# Patient Record
Sex: MALE | Race: WHITE
[De-identification: names, ages, dates, MRNs, and addresses within clinical notes are randomized per-mention and may not be internally consistent; named-entity substitution may affect disease eponyms.]

---

## 2018-01-21 ENCOUNTER — HOSPITAL ENCOUNTER (INPATIENT)
Dept: HOSPITAL 74 - JER | Age: 48
LOS: 3 days | Discharge: ANOTHER HOSPITAL | DRG: 720 | End: 2018-01-24
Attending: SPECIALIST | Admitting: SPECIALIST
Payer: COMMERCIAL

## 2018-01-21 VITALS — BODY MASS INDEX: 32.4 KG/M2

## 2018-01-21 DIAGNOSIS — I25.10: ICD-10-CM

## 2018-01-21 DIAGNOSIS — F17.210: ICD-10-CM

## 2018-01-21 DIAGNOSIS — J12.9: ICD-10-CM

## 2018-01-21 DIAGNOSIS — J11.08: ICD-10-CM
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
## 2018-01-21 DIAGNOSIS — E78.5: ICD-10-CM

## 2018-01-21 DIAGNOSIS — M62.82: ICD-10-CM

## 2018-01-21 DIAGNOSIS — R00.0: ICD-10-CM

## 2018-01-21 DIAGNOSIS — J96.01: ICD-10-CM

## 2018-01-21 DIAGNOSIS — I10: ICD-10-CM

## 2018-01-21 DIAGNOSIS — E87.1: ICD-10-CM

## 2018-01-21 DIAGNOSIS — R73.9: ICD-10-CM

## 2018-01-21 DIAGNOSIS — R94.5: ICD-10-CM

## 2018-01-21 DIAGNOSIS — N28.9: ICD-10-CM

## 2018-01-21 DIAGNOSIS — J45.909: ICD-10-CM

## 2018-01-21 DIAGNOSIS — F10.10: ICD-10-CM

## 2018-01-21 DIAGNOSIS — I24.8: ICD-10-CM

## 2018-01-21 DIAGNOSIS — E87.2: ICD-10-CM

## 2018-01-21 DIAGNOSIS — R65.21: ICD-10-CM

## 2018-01-21 DIAGNOSIS — E66.9: ICD-10-CM

## 2018-01-21 DIAGNOSIS — N17.9: ICD-10-CM

## 2018-01-21 DIAGNOSIS — A41.9: Primary | ICD-10-CM

## 2018-01-21 LAB
ALBUMIN SERPL-MCNC: 2 G/DL (ref 3.4–5)
ALBUMIN SERPL-MCNC: 2.4 G/DL (ref 3.4–5)
ALP SERPL-CCNC: 153 U/L (ref 45–117)
ALP SERPL-CCNC: 180 U/L (ref 45–117)
ALT SERPL-CCNC: 249 U/L (ref 12–78)
ALT SERPL-CCNC: 326 U/L (ref 12–78)
AMPHET UR-MCNC: NEGATIVE NG/ML
ANION GAP SERPL CALC-SCNC: 10 MMOL/L (ref 8–16)
ANION GAP SERPL CALC-SCNC: 13 MMOL/L (ref 8–16)
APPEARANCE UR: (no result)
APTT BLD: 31.2 SECONDS (ref 26.9–34.4)
ARTERIAL BLOOD GAS PCO2: 24.3 MMHG (ref 35–45)
ARTERIAL PATENCY WRIST A: POSITIVE
AST SERPL-CCNC: 625 U/L (ref 15–37)
AST SERPL-CCNC: 869 U/L (ref 15–37)
BARBITURATES UR-MCNC: NEGATIVE NG/ML
BASE EXCESS BLDA CALC-SCNC: -2.7 MEQ/L (ref -2–2)
BASOPHILS # BLD: 0.3 % (ref 0–2)
BASOPHILS # BLD: 0.6 % (ref 0–2)
BENZODIAZ UR SCN-MCNC: NEGATIVE NG/ML
BILIRUB SERPL-MCNC: 0.8 MG/DL (ref 0.2–1)
BILIRUB SERPL-MCNC: 1 MG/DL (ref 0.2–1)
BILIRUB UR STRIP.AUTO-MCNC: NEGATIVE MG/DL
BNP SERPL-MCNC: 107.08 PG/ML (ref 5–125)
BUN SERPL-MCNC: 40 MG/DL (ref 7–18)
BUN SERPL-MCNC: 42 MG/DL (ref 7–18)
CALCIUM SERPL-MCNC: 6.9 MG/DL (ref 8.5–10.1)
CALCIUM SERPL-MCNC: 7.2 MG/DL (ref 8.5–10.1)
CHLORIDE SERPL-SCNC: 101 MMOL/L (ref 98–107)
CHLORIDE SERPL-SCNC: 94 MMOL/L (ref 98–107)
CO2 SERPL-SCNC: 20 MMOL/L (ref 21–32)
CO2 SERPL-SCNC: 23 MMOL/L (ref 21–32)
COCAINE UR-MCNC: NEGATIVE NG/ML
COHGB MFR BLD: 1.6 GM% (ref 0.5–2)
COLOR UR: YELLOW
CREAT SERPL-MCNC: 1.9 MG/DL (ref 0.7–1.3)
CREAT SERPL-MCNC: 2.1 MG/DL (ref 0.7–1.3)
DEPRECATED RDW RBC AUTO: 13.2 % (ref 11.9–15.9)
DEPRECATED RDW RBC AUTO: 13.4 % (ref 11.9–15.9)
EOSINOPHIL # BLD: 0 % (ref 0–4.5)
EOSINOPHIL # BLD: 0 % (ref 0–4.5)
GLUCOSE SERPL-MCNC: 107 MG/DL (ref 74–106)
GLUCOSE SERPL-MCNC: 136 MG/DL (ref 74–106)
HCT VFR BLD CALC: 37.1 % (ref 35.4–49)
HCT VFR BLD CALC: 40.9 % (ref 35.4–49)
HGB BLD-MCNC: 12.4 GM/DL (ref 11.7–16.9)
HGB BLD-MCNC: 13.8 GM/DL (ref 11.7–16.9)
HYALINE CASTS URNS QL MICRO: 1 /LPF
INR BLD: 1.16 (ref 0.82–1.09)
KETONES UR QL STRIP: NEGATIVE
LEUKOCYTE ESTERASE UR QL STRIP.AUTO: NEGATIVE
LYMPHOCYTES # BLD: 10.7 % (ref 8–40)
LYMPHOCYTES # BLD: 7.3 % (ref 8–40)
MCH RBC QN AUTO: 30.7 PG (ref 25.7–33.7)
MCH RBC QN AUTO: 31.2 PG (ref 25.7–33.7)
MCHC RBC AUTO-ENTMCNC: 33.6 G/DL (ref 32–35.9)
MCHC RBC AUTO-ENTMCNC: 33.6 G/DL (ref 32–35.9)
MCV RBC: 91.3 FL (ref 80–96)
MCV RBC: 92.9 FL (ref 80–96)
METHADONE UR-MCNC: NEGATIVE NG/ML
MONOCYTES # BLD AUTO: 7.7 % (ref 3.8–10.2)
MONOCYTES # BLD AUTO: 7.8 % (ref 3.8–10.2)
MUCOUS THREADS URNS QL MICRO: (no result)
NEUTROPHILS # BLD: 81.3 % (ref 42.8–82.8)
NEUTROPHILS # BLD: 84.3 % (ref 42.8–82.8)
NITRITE UR QL STRIP: NEGATIVE
OPIATES UR QL SCN: NEGATIVE NG/ML
PCP UR QL SCN: NEGATIVE NG/ML
PH BLDV: 7.44 [PH] (ref 7.32–7.42)
PH UR: 5 [PH] (ref 5–8)
PLATELET # BLD AUTO: 220 K/MM3 (ref 134–434)
PLATELET # BLD AUTO: 258 K/MM3 (ref 134–434)
PMV BLD: 8.2 FL (ref 7.5–11.1)
PMV BLD: 8.4 FL (ref 7.5–11.1)
PO2 BLDA: 54.8 MMHG (ref 80–100)
POTASSIUM SERPLBLD-SCNC: 3.6 MMOL/L (ref 3.5–5.1)
POTASSIUM SERPLBLD-SCNC: 3.7 MMOL/L (ref 3.5–5.1)
PROT SERPL-MCNC: 6 G/DL (ref 6.4–8.2)
PROT SERPL-MCNC: 7.1 G/DL (ref 6.4–8.2)
PROT UR QL STRIP: (no result)
PROT UR QL STRIP: NEGATIVE
PT PNL PPP: 13.1 SEC (ref 9.98–11.88)
RBC # BLD AUTO: 3.99 M/MM3 (ref 4–5.6)
RBC # BLD AUTO: 4.48 M/MM3 (ref 4–5.6)
RBC # UR STRIP: (no result) /UL
SAO2 % BLDA: 88.1 % (ref 90–98.9)
SODIUM SERPL-SCNC: 127 MMOL/L (ref 136–145)
SODIUM SERPL-SCNC: 134 MMOL/L (ref 136–145)
SP GR UR: 1.01 (ref 1–1.03)
UROBILINOGEN UR STRIP-MCNC: NEGATIVE MG/DL (ref 0.2–1)
VENOUS PC02: 30.8 MMHG (ref 38–52)
VENOUS PO2: 35.3 MMHG (ref 28–48)
WBC # BLD AUTO: 11 K/MM3 (ref 4–10)
WBC # BLD AUTO: 11.4 K/MM3 (ref 4–10)

## 2018-01-21 PROCEDURE — 5A09357 ASSISTANCE WITH RESPIRATORY VENTILATION, LESS THAN 24 CONSECUTIVE HOURS, CONTINUOUS POSITIVE AIRWAY PRESSURE: ICD-10-PCS | Performed by: STUDENT IN AN ORGANIZED HEALTH CARE EDUCATION/TRAINING PROGRAM

## 2018-01-21 PROCEDURE — G0480 DRUG TEST DEF 1-7 CLASSES: HCPCS

## 2018-01-21 RX ADMIN — OSELTAMIVIR PHOSPHATE SCH MG: 75 CAPSULE ORAL at 22:08

## 2018-01-21 RX ADMIN — ACETAMINOPHEN PRN MG: 10 INJECTION, SOLUTION INTRAVENOUS at 20:36

## 2018-01-21 RX ADMIN — CHLORHEXIDINE GLUCONATE SCH APPLIC: 213 SOLUTION TOPICAL at 22:09

## 2018-01-21 RX ADMIN — VANCOMYCIN HYDROCHLORIDE SCH MLS/HR: 1 INJECTION, POWDER, LYOPHILIZED, FOR SOLUTION INTRAVENOUS at 21:33

## 2018-01-21 RX ADMIN — MUPIROCIN SCH APPLIC: 20 OINTMENT TOPICAL at 22:08

## 2018-01-21 RX ADMIN — LEVOFLOXACIN SCH: 5 INJECTION, SOLUTION INTRAVENOUS at 19:25

## 2018-01-21 NOTE — PDOC
Attending Attestation





- Resident


Resident Name: Saul Peñaloza





- ED Attending Attestation


I have performed the following: I have examined & evaluated the patient, The 

case was reviewed & discussed with the resident, I agree w/resident's findings 

& plan, Exceptions are as noted





- HPI


HPI: 





01/21/18 08:44


47y M hx of htn, hl asthma presents with complaint of sob. Per girlfriend, the 

pt has been having fever and cough for about a week with alot of congestion. GF 

also with similar symptoms last week. Pt endorses worsening sob, and gf finally 

convinced him to come to the ED today. 





Pt denies any chest pain, leg swelling, hemoptysis, dizziness, abd pain, n/v.





pt arrived with sat of 60% on RA, in mild distress, was started on NRB with 

improvement to 80%, started on bipap with improvement to 94% (on 12/6, 75% O2).

  


Pts pulm exam noted for scant rales at the bsaes and deminshed breathsounds b/l


abd soft notnender





ddx includes pna, influenza, ?consier PE, ?underlying lung/heart dz?


sepsis orderset initiated


cxr


continue bipap


will reassess


01/21/18 10:52





The pts labs were reviweed - cmp noted for:


 Laboratory Tests











  01/21/18 01/21/18 01/21/18





  08:10 08:10 09:30


 


Sodium    127 L


 


Potassium    3.6


 


Chloride    94 L


 


Carbon Dioxide    20 L


 


Anion Gap    13


 


BUN    42 H


 


Creatinine    2.1 H


 


Creat Clearance w eGFR    34.02


 


Random Glucose    136 H


 


Lactic Acid   2.1 H 


 


Calcium    7.2 L


 


Total Bilirubin    1.0


 


AST    869 H


 


ALT    326 H


 


Alkaline Phosphatase    180 H


 


Ammonia  75.5 H  


 


Creatine Kinase    6546 H


 


Creatine Kinase Index    0.0


 


CK-MB (CK-2)    < 1.0


 


Troponin I    0.06 H


 


Total Protein    7.1


 


Albumin    2.4 L








ALIDA, elevated LFTs, trop at .06 - 


?CHF - awaitqing BNP


pt doing well on bipap





will obtain cxr to r/o infiltrate as his cxr shows a very large guillaume - 

congestion vs. pna


pt on abx





01/21/18 12:01


pts bnp wnl


unclear etiology - 


will obtain CTA of chest


pt admitted to ICU for further management








01/21/18 16:13


CRITICAL CARE DOCUMENTATION:





I spent ~120 minutes of Critical Care time, excluding separately billable 

procedures, involving high complexity decision making to assess, manipulate and 

support vital system function(s) to treat single or multiple vital organ system 

failure and/or to prevent further life threatening deterioration of the patient'

s condition.





- Medical Decision Making





01/21/18 16:14


pt stable on bipap but quickly desaturates when bipap taken off





ct shows ?edema 


?ARDS?


low threshold for intubation


cards/pulm consulted








**Heart Score/ECG Review





- ECG Impressions


Comment:: 





01/21/18 12:39


Twelve-lead EKG was performed and reviewed by me. 


There is normal sinus rhythm with a rate of 110


Nonspecific T wave abnormality

## 2018-01-21 NOTE — CON.ID
Consult


Consult Specialty:: infectious disease


Referred by:: anais


Reason for Consultation:: fever, sob, cough





- History of Present Illness


Chief Complaint: cough, fever and chills for one week


History of Present Illness: 


47 year old man presented to Ed with cough, fever, chills for one week


his girlfriend and daughter alll had cough and fever


they went to [facility] on Wednesday


his daughter tested positive for influenza and was given tamiflu


he and his girlfriend tested negative and were given amox- he has taken two 

pills


eating poorly


no diarrhea


urinating okay


no hemoptysis


6 cig daily


no travel


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





no weight loss


etoh use- stopped one week ago when he was sick


was intermittently confused at home, now more oriented





denies hiv/IVDU


consents to HIV testing











- History Source


History Provided By: Patient, Significant Other


Limitations to Obtaining History: Clinical Condition





- Past Medical History


Cardio/Vascular: Yes: HTN, Other (hyperlipidemia)


Pulmonary: Yes: Asthma





- Past Surgical History


Past Surgical History: Yes: None





- Alcohol/Substance Use


Hx Alcohol Use: Yes (beer and wine)





- Smoking History


Smoking history: Current every day smoker


Have you smoked in the past 12 months: Yes


Aproximately how many cigarettes per day: 20





- Social History


Usual Living Arrangement: With Significant Other


ADL: Independent


Occupation: [employer] business


Came to [place]year[place] ad[place]


History of Recent Travel: No





Home Medications





- Allergies


Allergies/Adverse Reactions: 


 Allergies











Allergy/AdvReac Type Severity Reaction Status Date / Time


 


No Known Allergies Allergy   Verified 01/21/18 08:18














- Home Medications


Home Medications: 


Ambulatory Orders





Aspirin 81 mg PO DAILY 01/21/18 


Atorvastatin Ca [Lipitor] 20 mg PO HS 01/21/18 


Benzonatate 200 mg PO DAILY PRN 01/21/18 


Chlorthalidone 25 mg PO DAILY 01/21/18 


Metoprolol Succinate 25 mg PO DAILY 01/21/18 


Paroxetine HCl [Paxil] 20 mg PO DAILY 01/21/18 


Simvastatin 40 mg PO HS 01/21/18 











Family Disease History





- Family Disease History


Family History: Unable to Obtain





Review of Systems





- Review of Systems


Constitutional: reports: Fever, Loss of Appetite, Weakness


Eyes: reports: No Symptoms


HENT: reports: No Symptoms


Neck: reports: No Symptoms


Cardiovascular: denies: Chest Pain


Respiratory: reports: Cough.  denies: Hemoptysis


Gastrointestinal: reports: No Symptoms.  denies: Abdominal Pain, Diarrhea


Genitourinary: denies: Dysuria, Flank Pain


Musculoskeletal: reports: No Symptoms


Integumentary: reports: No Symptoms


Neurological: reports: No Symptoms





Physical Exam


Vital Signs: 


 Vital Signs











Temperature  102.5 F H  01/21/18 09:05


 


Pulse Rate  77   01/21/18 14:51


 


Respiratory Rate  22   01/21/18 14:51


 


Blood Pressure  105/73   01/21/18 14:51


 


O2 Sat by Pulse Oximetry (%)  100   01/21/18 14:51











Psychiatric: Yes: Alert, Oriented


Labs: 


 CBC, BMP





 01/21/18 08:10 





 01/21/18 09:30 











Imaging





- Results


Chest X-ray: Report Reviewed, Image Reviewed


Cat Scan: Report Reviewed, Image Reviewed





Problem List





- Problems


(1) Acute respiratory failure


Code(s): J96.00 - ACUTE RESPIRATORY FAILURE, UNSP W HYPOXIA OR HYPERCAPNIA   





(2) Pneumonia


Code(s): J18.9 - PNEUMONIA, UNSPECIFIED ORGANISM   





(3) Hyponatremia


Code(s): E87.1 - HYPO-OSMOLALITY AND HYPONATREMIA   





(4) Rhabdomyolysis


Code(s): M62.82 - RHABDOMYOLYSIS   





(5) Abnormal LFTs


Code(s): R94.5 - ABNORMAL RESULTS OF LIVER FUNCTION STUDIES   





Assessment/Plan





impending respiratory failure


suspect secondary to Influenza given history from his girlfriend who is a nurse





would treat for influenza and bacterial pneumonia


vancomycin/levaquin/tamiflu


he has hyponatremia and elevated LFTs/rhabdomyolysis 


will cover for atypical pneumonia as well





droplet isolation


he consents to HIV testing





hiv


legionella urinary antigen


repeat cmp/cpk


vanco trough


hep serology








d/w intensivist


d/w ER staff





d/w cardiology


over 45 minutes spent in the care of this critically illl ICU patient

## 2018-01-21 NOTE — HP
Admitting History and Physical





- Admission


Chief Complaint: not feeling well / coughing /


History of Present Illness: 











The patient is a 47M with a PMH of HTN, asthma, and EtOH abuse who presents to 

the ED with complaints of "feeling sick". The patient is accompanied with his 

fiance who is a nurse and providing some of the history. The patient states 

that he's been coughing for 1 week, has had fevers and chills. The fiance 

states that the patient has also been delusional and talking to himself. He 

denies any CP, SOB, nausea, vomiting, myalgias, abdominal pain. The patient 

drinks 3-4 beers a day and a pint of liquor. He has not had a drink in 8 days 

since he's been sick. He was seen at an  on Wednesday (5 days ago) and was 

given amoxicillin and told to get evaluated in the ER because of his breathing, 

but he did not until he presented this morning.





History Source: Patient, Significant Other


Limitations to Obtaining History: No Limitations





- Past Medical History


Cardiovascular: Yes: HTN, Other (hyperlipidemia)


Pulmonary: Yes: Asthma





- Past Surgical History


Past Surgical History: Yes: None





- Smoking History


Smoking history: Current every day smoker


Have you smoked in the past 12 months: Yes


Aproximately how many cigarettes per day: 20





- Alcohol/Substance Use


Hx Alcohol Use: Yes (beer and wine)





- Social History


ADL: Independent


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


History of Recent Travel: No





Home Medications





- Allergies


Allergies/Adverse Reactions: 


 Allergies











Allergy/AdvReac Type Severity Reaction Status Date / Time


 


No Known Allergies Allergy   Verified 18 08:18














- Home Medications


Home Medications: 


Ambulatory Orders





Aspirin 81 mg PO DAILY 18 


Atorvastatin Ca [Lipitor] 20 mg PO HS 18 


Benzonatate 200 mg PO DAILY PRN 18 


Chlorthalidone 25 mg PO DAILY 18 


Metoprolol Succinate 25 mg PO DAILY 18 


Paroxetine HCl [Paxil] 20 mg PO DAILY 18 


Simvastatin 40 mg PO HS 18 











Family Disease History





- Family Disease History


Family Disease History: Heart Disease: Father ( of "heart problems" age 47)





Physical Examination


Vital Signs: 


 Vital Signs











Temperature  102.5 F H  18 09:05


 


Pulse Rate  77   18 14:51


 


Respiratory Rate  22   01/21/18 14:51


 


Blood Pressure  105/73   18 14:51


 


O2 Sat by Pulse Oximetry (%)  94 L  18 18:32











Constitutional: Yes: Well Nourished, Anxious


Eyes: Yes: Conjunctiva Clear, EOM Intact


HENT: Yes: Atraumatic, Normocephalic


Neck: Yes: Supple, Trachea Midline


Cardiovascular: Yes: Regular Rate and Rhythm


Respiratory: Yes: CTA Bilaterally.  No: Other (Wearing Bipap  Fio2 80 with 98%)


Gastrointestinal: Yes: Normal Bowel Sounds, Soft


Renal/: Yes: WNL


Breast(s): Yes: WNL


Musculoskeletal: Yes: WNL


Extremities: Yes: WNL


Edema: No


Peripheral Pulses WNL: Yes


Peripheral Pulses: Left Radial: 2+, Right Radial: 2+, Left Doralis Pedis: 2+, 

Right Dorsalis Pedis: 2+, Left Femoral: 2+, Right Femoral: 2+


Integumentary: Yes: WNL


Neurological: Yes: WNL, Alert, Oriented


...Motor Strength: WNL


Psychiatric: Yes: Alert, Oriented


Labs: 


 CBC, BMP





 18 15:44 





 18 15:44 











Problem List





- Problems


(1) ARDS (adult respiratory distress syndrome)


Assessment/Plan: 


bipap keep O2 sat >90%  FiO2  @80%


pulmonary consult 


     ICU care 


ID consult  


    emperic tx 


Isolation 


Code(s): J80 - ACUTE RESPIRATORY DISTRESS SYNDROME   





(2) Rhabdomyolysis


Assessment/Plan: 


IV fluids 


follow labs 


Code(s): M62.82 - RHABDOMYOLYSIS   





(3) Abnormal LFTs


Assessment/Plan: 


hx of ETOH 


will repeat  in am


if persist GI consult 


    U tox  / ETOH level  negative 


    last drink 8 days ago 


Code(s): R94.5 - ABNORMAL RESULTS OF LIVER FUNCTION STUDIES   





(4) Acute respiratory failure


Assessment/Plan: 


ctof chest   c/w ARDS 


bipap 


Code(s): J96.00 - ACUTE RESPIRATORY FAILURE, UNSP W HYPOXIA OR HYPERCAPNIA   





(5) Hypoxia


Assessment/Plan: 


bipap





Code(s): R09.02 - HYPOXEMIA   





(6) Obesity


Code(s): E66.9 - OBESITY, UNSPECIFIED   





(7) Pneumonia


Code(s): J18.9 - PNEUMONIA, UNSPECIFIED ORGANISM   





(8) Substance abuse


Code(s): F19.10 - OTHER PSYCHOACTIVE SUBSTANCE ABUSE, UNCOMPLICATED

## 2018-01-21 NOTE — CON.CARD
Consult


Consult Specialty:: cardiology


Reason for Consultation:: shortness of breath





- History of Present Illness


Chief Complaint: markedly short of breath


History of Present Illness: 





47y M hx of htn, hyperlipidemia,  asthma, obesity, substance abuse (cocaine 

years ago; tobacoo; alcohol--last drink one week ago; ?anaboic steroids) 

presents with complaint of sob. Per girlfriend, the pt has been having fever 

and cough for about a week with alot of congestion. GF also with similar 

symptoms last week. Pt endorses worsening sob, and gf finally convinced him to 

come to the ED today. One family member is being treated for influenza.





Pt denies any chest pain, leg swelling, hemoptysis, dizziness, abd pain, n/v.





pt arrived with sat of 60% on RA, in mild distress, was started on NRB with 

improvement to 80%, started on bipap with improvement to 94% (on , 75% O2).

  


Pts pulm exam noted for scant rales at the bsaes and deminshed breathsounds b/l


abd soft notnender





ddx includes pna, influenza, ?consier PE, ?underlying lung/heart dz?


sepsis orderset initiated


cxr


continue bipap





- History Source


History Provided By: Patient, Family Member, Medical Record


Limitations to Obtaining History: Other (pt with Bipap mask; finds it difficult 

to breathe; agitated)





- Past Medical History


Cardio/Vascular: Yes: HTN, Other (hyperlipidemia)


Pulmonary: Yes: Asthma


Renal/: Yes: Renal Inusuff





- Past Surgical History


Past Surgical History: Yes: None





- Alcohol/Substance Use


Hx Alcohol Use: Yes (beer and wine)





- Smoking History


Smoking history: Current every day smoker


Have you smoked in the past 12 months: Yes


Aproximately how many cigarettes per day: 20





- Social History


Usual Living Arrangement: With Significant Other


ADL: Independent


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


History of Recent Travel: No





Home Medications





- Allergies


Allergies/Adverse Reactions: 


 Allergies











Allergy/AdvReac Type Severity Reaction Status Date / Time


 


No Known Allergies Allergy   Verified 18 08:18














- Home Medications


Home Medications: 


Ambulatory Orders





Aspirin 81 mg PO DAILY 18 


Atorvastatin Ca [Lipitor] 20 mg PO HS 18 


Benzonatate 200 mg PO DAILY PRN 18 


Chlorthalidone 25 mg PO DAILY 18 


Metoprolol Succinate 25 mg PO DAILY 18 


Paroxetine HCl [Paxil] 20 mg PO DAILY 18 


Simvastatin 40 mg PO HS 18 











Family Disease History





- Family Disease History


Family Disease History: Heart Disease: Father ( of "heart problems" age 47)





Review of Systems





- Review of Systems


Constitutional: reports: Fever, Other


Eyes: reports: No Symptoms


HENT: reports: No Symptoms


Neck: reports: No Symptoms


Cardiovascular: reports: Shortness of Breath


Respiratory: reports: Cough, SOB


Gastrointestinal: reports: No Symptoms


Breasts: reports: No Symptoms Reported


Musculoskeletal: denies: Muscle Pain


Integumentary: reports: No Symptoms


Neurological: reports: No Symptoms


Endocrine: reports: No Symptoms


Hematology/Lymphatic: reports: No Symptoms


Psychiatric: reports: No Symptoms





- Risk Factors


Known Risk Factors: Yes: Age, Family History, Gender, Hypercholesterolemia, 

Other (obesity; substance abuse)


Vital Signs: 


 Vital Signs











Temperature  102.5 F H  18 09:05


 


Pulse Rate  77   18 14:51


 


Respiratory Rate  22   18 14:51


 


Blood Pressure  105/73   18 14:51


 


O2 Sat by Pulse Oximetry (%)  100   18 14:51











Constitutional: Yes: Anxious, Moderate Distress


Eyes: Yes: WNL


HENT: Yes: WNL


Neck: Yes: WNL


Respiratory: Yes: Cough, On BiPap, Poor Air Entry, SOB, Tachypnea


Gastrointestinal: Yes: Soft, Abdomen, Obese


Renal/: No: Anuria


Cardiovascular: Yes: Tachycardia


JVD: No


Carotid Bruit: No


PMI: Displaced


Heart Sounds: Yes: S1, S2, S4


Edema: No


Peripheral Pulses WNL: Yes


Integumentary: Yes: WNL


Neurological: Yes: WNL


Psychiatric: Yes: WNL





- Other Data


Labs, Other Data: 


 CBC, BMP





 18 15:44 





 INR, PTT











INR  1.16  (0.82-1.09)  H  18  08:10    








 Troponin, BNP











  18





  09:30


 


Troponin I  0.06 H


 


B-Natriuretic Peptide  107.08








 Troponin, BNP











  18





  09:30


 


Troponin I  0.06 H


 


B-Natriuretic Peptide  107.08








 Abnormal Lab Results











  18





  08:10 08:10 08:10


 


WBC  11.4 H  


 


RBC   


 


Neutrophils %  84.3 H  


 


Lymphocytes %  7.3 L  


 


PT with INR   13.10 H 


 


INR   1.16 H 


 


ABG pH   


 


ABG pCO2 at Pt Temp   


 


ABG pO2 at Pt Temp   


 


ABG HCO3   


 


ABG O2 Sat (Measured)   


 


ABG Base Excess   


 


VBG pH    7.44 H


 


POC VBG pCO2    30.8 L


 


Sodium   


 


Chloride   


 


Carbon Dioxide   


 


BUN   


 


Creatinine   


 


Random Glucose   


 


Lactic Acid   


 


Calcium   


 


AST   


 


ALT   


 


Alkaline Phosphatase   


 


Ammonia   


 


Creatine Kinase   


 


Troponin I   


 


Albumin   


 


Urine Protein   


 


Urine Blood   














  18





  08:10 08:10 09:00


 


WBC   


 


RBC   


 


Neutrophils %   


 


Lymphocytes %   


 


PT with INR   


 


INR   


 


ABG pH    7.49 H


 


ABG pCO2 at Pt Temp    24.3 L


 


ABG pO2 at Pt Temp    54.8 L


 


ABG HCO3    18.5 L


 


ABG O2 Sat (Measured)    88.1 L


 


ABG Base Excess    -2.7 L


 


VBG pH   


 


POC VBG pCO2   


 


Sodium   


 


Chloride   


 


Carbon Dioxide   


 


BUN   


 


Creatinine   


 


Random Glucose   


 


Lactic Acid   2.1 H 


 


Calcium   


 


AST   


 


ALT   


 


Alkaline Phosphatase   


 


Ammonia  75.5 H  


 


Creatine Kinase   


 


Troponin I   


 


Albumin   


 


Urine Protein   


 


Urine Blood   














  18





  09:30 13:20 15:44


 


WBC    11.0 H


 


RBC    3.99 L


 


Neutrophils %   


 


Lymphocytes %   


 


PT with INR   


 


INR   


 


ABG pH   


 


ABG pCO2 at Pt Temp   


 


ABG pO2 at Pt Temp   


 


ABG HCO3   


 


ABG O2 Sat (Measured)   


 


ABG Base Excess   


 


VBG pH   


 


POC VBG pCO2   


 


Sodium  127 L  


 


Chloride  94 L  


 


Carbon Dioxide  20 L  


 


BUN  42 H  


 


Creatinine  2.1 H  


 


Random Glucose  136 H  


 


Lactic Acid   


 


Calcium  7.2 L  


 


AST  869 H  


 


ALT  326 H  


 


Alkaline Phosphatase  180 H  


 


Ammonia   


 


Creatine Kinase  6546 H  


 


Troponin I  0.06 H  


 


Albumin  2.4 L  


 


Urine Protein   2+ H 


 


Urine Blood   3+ H 














Imaging





- Results


Cat Scan: Image Reviewed


EKG: Image Reviewed (sinus tachycardia)





Problem List





- Problems


(1) Substance abuse


Assessment/Plan: 


as per pt's PMD (Dr. Krause), pt may have, in years past, used cocaine. He 

drinks heavily at times, is a daily cigarettes smoker. He is a personal bret 

at a gym, and may have used steroids.


He has been noncompliant to doctors' visits and medications.


Code(s): F19.10 - OTHER PSYCHOACTIVE SUBSTANCE ABUSE, UNCOMPLICATED   





(2) ARDS (adult respiratory distress syndrome)


Assessment/Plan: 


On Bipap, with improvement in O2 sat from 60& to 94%.


On Tamiflu for presumed influenza.


ECHO for LVEF, chamber sizes, valve status.


Code(s): J80 - ACUTE RESPIRATORY DISTRESS SYNDROME   





(3) Hyponatremia


Code(s): E87.1 - HYPO-OSMOLALITY AND HYPONATREMIA   





(4) Rhabdomyolysis


Assessment/Plan: 


r/o rhabdomyolysis


Pt received fluids, but problematic given state of lungs.


F/u CK, BUN/Cr, Is and Os, weight serially.


Code(s): M62.82 - RHABDOMYOLYSIS   





(5) Obesity


Code(s): E66.9 - OBESITY, UNSPECIFIED   





(6) Congestive cardiac failure


Assessment/Plan: 


Enlarged heart with ?LV dilation on CT chest.


; 


For ECHO (LVEF; chamber sizes; wall motion; valve status.


Extensive coronary artery calcifications on CT chest, with multiple cardiac 

risks, including his father dying at 47 yrs old of "heart disease"; will 

require coronary artery evaluation with stress test and/or angiogram when 

stable.


TNI 0.06; f/u serially.


F/u TSH; lipids.





Code(s): I50.9 - HEART FAILURE, UNSPECIFIED   


Qualifiers: 


   Congestive heart failure type: unspecified   Congestive heart failure 

chronicity: unspecified   Qualified Code(s): I50.9 - Heart failure, unspecified

   





(7) Cigarette nicotine dependence


Assessment/Plan: 


Pt has not smoked for a week; consider aid with patch or pill.


Code(s): F17.210 - NICOTINE DEPENDENCE, CIGARETTES, UNCOMPLICATED   





(8) Helm cardiac risk >20% in next 10 years


Assessment/Plan: 


Coronary artery evaluation with stress MIBI and/or coronary angiogram when 

stable.


Aggressive lipid control; diet change to heart-healthy one, with weight loss; 

BP control; treatment for substance abuse (including alcohol).


Code(s): Z91.89 - OTH PERSONAL RISK FACTORS, NOT ELSEWHERE CLASSIFIED   





(9) Hyperglycemia


Assessment/Plan: 


f/u fasting glucose, HGBA1c.


Code(s): R73.9 - HYPERGLYCEMIA, UNSPECIFIED   





(10) Renal insufficiency


Assessment/Plan: 


on fluids (problematic with pulmonary status).





Code(s): N28.9 - DISORDER OF KIDNEY AND URETER, UNSPECIFIED

## 2018-01-21 NOTE — CONSULT
Consult


Consult Specialty:: PULM/CCM 


Referred by:: NAWAF


Reason for Consultation:: SOB





- History of Present Illness


Chief Complaint: SOB


History of Present Illness: 


47 M, previous ETOH abuse (stopped 1 week ago), active smoker, no IVDU, no 

clear occupational exposure history.  (+) Flu contact -> daughter.  


Reports never screened for HIV. 


No apparent travel history.  No hemoptysis. 





Called to the ER due to severe hypoxic acute respiratory failure.  Awake and 

alert on NIPPV.  80% FiO2 with a saturation of 95%.  





CT: ARDS pattern with areas of consolidation. 


 





- History Source


History Provided By: Patient


Limitations to Obtaining History: Poor Historian





- Past Medical History


Pulmonary: Yes: Asthma, Sleep Apnea.  No: COPD, Pneumonia, Previously Intubated

, Pulmonary Embolus





- Smoking History


Smoking history: Current every day smoker


Aproximately how many cigarettes per day: 20





Home Medications





- Allergies


Allergies/Adverse Reactions: 


 Allergies











Allergy/AdvReac Type Severity Reaction Status Date / Time


 


No Known Allergies Allergy   Verified 01/21/18 08:18














- Home Medications


Home Medications: 


Ambulatory Orders





Aspirin 81 mg PO DAILY 01/21/18 


Atorvastatin Ca [Lipitor] 20 mg PO HS 01/21/18 


Benzonatate 200 mg PO DAILY PRN 01/21/18 


Chlorthalidone 25 mg PO DAILY 01/21/18 


Metoprolol Succinate 25 mg PO DAILY 01/21/18 


Paroxetine HCl [Paxil] 20 mg PO DAILY 01/21/18 


Simvastatin 40 mg PO HS 01/21/18 











Review of Systems





- Review of Systems


Constitutional: reports: Chills, Fever, Malaise, Weakness.  denies: Night Sweats

, Unintentional Wgt. Loss


Eyes: reports: No Symptoms


HENT: reports: No Symptoms


Neck: reports: No Symptoms


Cardiovascular: reports: Shortness of Breath.  denies: Chest Pain, Edema, 

Palpitations


Respiratory: reports: Cough, Snoring, SOB, SOB on Exertion, Wheezing.  denies: 

Hemoptysis


Gastrointestinal: reports: No Symptoms


Genitourinary: reports: No Symptoms


Breasts: reports: No Symptoms Reported


Musculoskeletal: reports: No Symptoms


Integumentary: reports: No Symptoms


Neurological: reports: No Symptoms


Endocrine: reports: No Symptoms


Hematology/Lymphatic: reports: No Symptoms


Psychiatric: reports: No Symptoms





Physical Exam


Vital Signs: 


 Vital Signs











Temperature  102.5 F H  01/21/18 09:05


 


Pulse Rate  77   01/21/18 14:51


 


Respiratory Rate  22   01/21/18 14:51


 


Blood Pressure  105/73   01/21/18 14:51


 


O2 Sat by Pulse Oximetry (%)  100   01/21/18 14:51











Constitutional: Yes: Moderate Distress


Eyes: Yes: Conjunctiva Clear, EOM Intact


HENT: Yes: Atraumatic, Normocephalic


Neck: Yes: Supple, Trachea Midline


Cardiovascular: Yes: Tachycardia


Respiratory: Yes: Accessory Muscle Use, Cough, On BiPap, Rhonchi, SOB, 

Tachypnea.  No: Stridor, Wheezes


Gastrointestinal: Yes: Normal Bowel Sounds, Soft, Abdomen, Obese


Renal/: Yes: WNL


Musculoskeletal: Yes: WNL


Extremities: Yes: WNL


Edema: No


Peripheral Pulses WNL: Yes


Integumentary: Yes: WNL


Neurological: Yes: Alert, Oriented


...Motor Strength: WNL


Psychiatric: Yes: WNL, Alert, Oriented


Labs: 


 CBC, BMP





 01/21/18 08:10 





 01/21/18 09:30 











Imaging





- Results


Chest X-ray: Report Reviewed, Image Reviewed


Cat Scan: Report Reviewed, Image Reviewed





Problem List





- Problems


(1) ARDS (adult respiratory distress syndrome)


Code(s): J80 - ACUTE RESPIRATORY DISTRESS SYNDROME   





(2) Acute respiratory failure


Code(s): J96.00 - ACUTE RESPIRATORY FAILURE, UNSP W HYPOXIA OR HYPERCAPNIA   





(3) Rhabdomyolysis


Code(s): M62.82 - RHABDOMYOLYSIS   





(4) Hyponatremia


Code(s): E87.1 - HYPO-OSMOLALITY AND HYPONATREMIA   





(5) Hypoxia


Code(s): R09.02 - HYPOXEMIA   





Assessment/Plan


NIPPV settings were adjusted and patient is now more comfortable with a 

saturation of 95%


ABX / Tamiflu per ID 


Aspiration precautions


Judicious IVF so as not to worsen ARDS physiology 


BD TX 


D/W wife and patient low threshold for intubation and mechanical ventilation


Strict I&O 


VTE prophylaxis 


ICU monitoring





Dr Otto 


Critical care time spent in reviewing chart, evaluating patient and formulating 

plan - 36 minutes.

## 2018-01-21 NOTE — EKG
Test Reason : 

Blood Pressure : ***/*** mmHG

Vent. Rate : 097 BPM     Atrial Rate : 097 BPM

   P-R Int : 166 ms          QRS Dur : 080 ms

    QT Int : 326 ms       P-R-T Axes : 025 -01 012 degrees

   QTc Int : 414 ms

 

NORMAL SINUS RHYTHM

MINIMAL VOLTAGE CRITERIA FOR LVH, MAY BE NORMAL VARIANT

NONSPECIFIC T WAVE ABNORMALITY

ABNORMAL ECG

NO PREVIOUS ECGS AVAILABLE

Confirmed by Duran Batres ([id number]) on 1/21/2018 4:04:42 PM

 

Referred By:             Confirmed By:Duran Batres

## 2018-01-21 NOTE — PDOC
History of Present Illness





- General


Chief Complaint: Shortness of Breath


Stated Complaint: FEVER,DIFFICULTY BREATHING,HIGH BLOOD PRESSURE


Time Seen by Provider: 01/21/18 08:24


History Source: Patient


Exam Limitations: No Limitations





- History of Present Illness


Initial Comments: 





01/21/18 10:08


The patient is a 47M with a PMH of HTN, asthma, and EtOH abuse who presents to 

the ED with complaints of "feeling sick". The patient is accompanied with his 

fiance who is a nurse and providing some of the history. The patient states 

that he's been coughing for 1 week, has had fevers and chills. The fiance 

states that the patient has also been delusional and talking to himself. He 

denies any CP, SOB, nausea, vomiting, myalgias, abdominal pain. The patient 

drinks 3-4 beers a day and a pint of liquor. He has not had a drink in 8 days 

since he's been sick. He was seen at an  on Wednesday (5 days ago) and was 

given amoxicillin and told to get evaluated in the ER because of his breathing, 

but he did not until he presented this morning.





Past History





- Past Medical History


Allergies/Adverse Reactions: 


 Allergies











Allergy/AdvReac Type Severity Reaction Status Date / Time


 


No Known Allergies Allergy   Verified 01/21/18 08:18











Home Medications: 


Ambulatory Orders





Aspirin 81 mg PO DAILY 01/21/18 


Atorvastatin Ca [Lipitor] 20 mg PO HS 01/21/18 


Benzonatate 200 mg PO DAILY PRN 01/21/18 


Chlorthalidone 25 mg PO DAILY 01/21/18 


Metoprolol Succinate 25 mg PO DAILY 01/21/18 


Paroxetine HCl [Paxil] 20 mg PO DAILY 01/21/18 


Simvastatin 40 mg PO HS 01/21/18 








Asthma: Yes


COPD: No


HTN: Yes


Other medical history: alcohol abuse





- Suicide/Smoking/Psychosocial Hx


Smoking History: Current every day smoker


Number of Cigarettes Smoked Daily: 20


Information on smoking cessation initiated: No


Substance Use Type: Alcohol





**Review of Systems





- Review of Systems


Able to Perform ROS?: Yes


Comments:: 





01/21/18 10:37


GENERAL/CONSTITUTIONAL: Positive for fevers and chills. No weakness.


HEAD, EYES, EARS, NOSE AND THROAT: No change in vision. No ear pain or 

discharge. No sore throat.


GASTROINTESTINAL: No nausea, vomiting, diarrhea, constipation, or abdominal 

pain. 


GENITOURINARY: No dysuria, frequency, hematuria, or change in urination.


CARDIOVASCULAR: No chest pain, palpitations, or lightheadedness.


RESPIRATORY: Positive for cough. No wheezing, shortness of breath, or 

hemoptysis.


MUSCULOSKELETAL: Positive for back pain. No joint or muscle swelling or pain. 


SKIN: No rash or lesions. 


NEUROLOGIC: No headache, numbness, tingling, weakness, loss of consciousness, 

or change in strength/sensation.


ENDOCRINE: No increased thirst. No abnormal weight change.


HEMATOLOGIC/LYMPHATIC: No anemia, easy bleeding, or history of blood clots.


ALLERGIC/IMMUNOLOGIC: No hives or skin allergy.


Is the patient limited English proficient: No





*Physical Exam





- Vital Signs


 Last Vital Signs











Temp Pulse Resp BP Pulse Ox


 


 102.5 F H  92 H  24   110/73   96 


 


 01/21/18 09:05  01/21/18 09:52  01/21/18 09:52  01/21/18 09:52  01/21/18 09:52














- Physical Exam


Comments: 





01/21/18 10:40


GENERAL: Well developed, well nourished. Awake and alert. No acute distress.


HEENT: Normocephalic, atraumatic. Hearing grossly normal. Moist mucous 

membranes. PERRLA, EOMI. No conjunctival pallor. Sclera are mildly icteric. 

Oropharynx is clear.


NECK: Supple. Full ROM. No JVD. 


CARDIOVASCULAR: Regular rate and rhythm. No murmurs, rubs, or gallops. 


PULMONARY: No evidence of respiratory distress. Mild rales in LL lobe. 


ABDOMINAL: Soft. Non-tender. Non-distended. No rebound or guarding. 


GENITOURINARY: No CVA tenderness bilaterally.


MUSCULOSKELETAL: Normal range of motion at all joints. No bony deformities or 

tenderness. 


EXTREMITIES: No cyanosis. No clubbing. No edema. No calf tenderness.


SKIN: Warm and dry. Normal capillary refill. No rashes. No jaundice. 


NEUROLOGICAL: Alert, awake, appropriate. Cranial nerves 2-12 intact. Normal 

speech. Gait is normal without ataxia.


PSYCHIATRIC: Cooperative. Good eye contact. Appropriate mood and affect.








ED Treatment Course





- LABORATORY


CBC & Chemistry Diagram: 


 01/21/18 08:10





 01/21/18 09:30





- ADDITIONAL ORDERS


Additional order review: 


 Laboratory  Results











  01/21/18 01/21/18 01/21/18





  09:00 08:20 08:10


 


PT with INR   


 


INR   


 


PTT (Actin FS)   


 


Puncture Site  Left radial  


 


ABG pH  7.49 H  


 


ABG pCO2 at Pt Temp  24.3 L  


 


ABG pO2 at Pt Temp  54.8 L  


 


ABG HCO3  18.5 L  


 


ABG O2 Sat (Measured)  88.1 L  


 


ABG O2 Content  17.8  


 


ABG Base Excess  -2.7 L  


 


Long Test  Positive  


 


VBG pH   


 


POC VBG pCO2   


 


POC VBG pO2   


 


Mixed VBG HCO3   


 


Carboxyhemoglobin  1.6  


 


Methemoglobin  0.9  


 


O2 Delivery Device  Bipap  


 


Oxygen Flow Rate  75%  


 


Vent Mode  S/t  


 


Vent Rate  10  


 


Mechanical Rate  Bipap  


 


Pressure Support Vent  12/6  


 


Lactic Acid    2.1 H


 


Ammonia   


 


Blood Type   A POSITIVE 


 


Antibody Screen   Negative 














  01/21/18 01/21/18 01/21/18





  08:10 08:10 08:10


 


PT with INR    13.10 H


 


INR    1.16 H


 


PTT (Actin FS)    31.2


 


Puncture Site   


 


ABG pH   


 


ABG pCO2 at Pt Temp   


 


ABG pO2 at Pt Temp   


 


ABG HCO3   


 


ABG O2 Sat (Measured)   


 


ABG O2 Content   


 


ABG Base Excess   


 


Long Test   


 


VBG pH   7.44 H 


 


POC VBG pCO2   30.8 L 


 


POC VBG pO2   35.3 


 


Mixed VBG HCO3   20.7 


 


Carboxyhemoglobin   


 


Methemoglobin   


 


O2 Delivery Device   


 


Oxygen Flow Rate   


 


Vent Mode   


 


Vent Rate   


 


Mechanical Rate   


 


Pressure Support Vent   


 


Lactic Acid   


 


Ammonia  75.5 H  


 


Blood Type   


 


Antibody Screen   








 











  01/21/18





  08:10


 


RBC  4.48


 


MCV  91.3


 


MCHC  33.6


 


RDW  13.2


 


MPV  8.2


 


Neutrophils %  84.3 H


 


Lymphocytes %  7.3 L


 


Monocytes %  7.8


 


Eosinophils %  0.0


 


Basophils %  0.6














- RADIOLOGY


Radiology Studies Ordered: 














 Category Date Time Status


 


 CHEST X-RAY PORTABLE* [RAD] Stat Radiology  01/21/18 08:37 Completed














- Medications


Given in the ED: 


ED Medications














Discontinued Medications














Generic Name Dose Route Start Last Admin





  Trade Name Freq  PRN Reason Stop Dose Admin


 


Acetaminophen  1,000 mg  01/21/18 09:07  01/21/18 09:12





  Ofirmev Injection -  IVPB  01/21/18 09:08  1,000 mg





  ONCE ONE   Administration


 


Piperacillin Sod/Tazobactam Sod  4.5 gm  01/21/18 09:59  01/21/18 10:06





  Zosyn 4.5gm Ivpb (Pre-Docked)  IVPB  01/21/18 10:00  4.5 gm





  ONCE ONE   Administration


 


Sodium Chloride  1,000 ml  01/21/18 08:59  01/21/18 09:12





  Normal Saline -  IV  01/21/18 09:00  1,000 ml





  ONCE ONE   Administration














Medical Decision Making





- Medical Decision Making





01/21/18 10:41


The patient is a 47M with a PMH of HTN and asthma who presents to the ER with a 

cough, fever, and back pain. He was found to be hypoxic to 55-60's on RA. I 

placed the patient on bipap and his saturation improved to the low 90's. The 

patient also had a fever of 102.5. Septic protocol is being followed. CXR shows 

congestive changes with questionable infiltrate. I have covered the patient 

with broad spectrum abx and given 1 g of tylenol for fever control. Patient is 

tolerating bipap well. Will monitor closely.





01/21/18 10:54


I have endorsed the patient to Dr. Sumner who accepts admission to an ICU 

bed. Will page Dr. Mallory, intensivist. 





01/21/18 11:22


I have spoken to Dr. Otto for ICU admission. He agrees and would like Dr. Mcarthur consulted for ID.





Pending CT chest for patient.





01/21/18 14:45


CT chest read: Extensive bilateral upper and lower lung field interstitial and 

alveolar opacities are noted with a preferential posterior involvement. The CT 

pattern is more suggestive of interstitial and alveolar edema (cardiogenic 

versus noncardiogenic) and probably less likely on the basis of pneumonitis. 

Correlate clinically. Extensive atherosclerotic coronary artery calcifications 

are seen. Possible mild left ventricular dilatation.





Pt is continuing to desaturate off bipap. His fiance is removing his mask to 

give him water. I have instructed her not to do this as it is compensating his 

O2 sats.








*DC/Admit/Observation/Transfer


Diagnosis at time of Disposition: 


 Hypoxia





Congestive cardiac failure


Qualifiers:


 Congestive heart failure type: unspecified Congestive heart failure chronicity

: unspecified Qualified Code(s): I50.9 - Heart failure, unspecified








- Discharge Dispostion


Condition at time of disposition: Guarded


Admit: Yes





- Referrals





- Patient Instructions





- Post Discharge Activity

## 2018-01-22 LAB
ALBUMIN SERPL-MCNC: 2.1 G/DL (ref 3.4–5)
ALP SERPL-CCNC: 163 U/L (ref 45–117)
ALT SERPL-CCNC: 227 U/L (ref 12–78)
ANION GAP SERPL CALC-SCNC: 11 MMOL/L (ref 8–16)
AST SERPL-CCNC: 500 U/L (ref 15–37)
BILIRUB SERPL-MCNC: 0.8 MG/DL (ref 0.2–1)
BUN SERPL-MCNC: 29 MG/DL (ref 7–18)
CALCIUM SERPL-MCNC: 7.3 MG/DL (ref 8.5–10.1)
CHLORIDE SERPL-SCNC: 105 MMOL/L (ref 98–107)
CO2 SERPL-SCNC: 21 MMOL/L (ref 21–32)
CREAT SERPL-MCNC: 1.3 MG/DL (ref 0.7–1.3)
DEPRECATED RDW RBC AUTO: 13.2 % (ref 11.9–15.9)
GLUCOSE SERPL-MCNC: 96 MG/DL (ref 74–106)
HCT VFR BLD CALC: 39.2 % (ref 35.4–49)
HGB BLD-MCNC: 13.2 GM/DL (ref 11.7–16.9)
MAGNESIUM SERPL-MCNC: 2.8 MG/DL (ref 1.8–2.4)
MCH RBC QN AUTO: 31.2 PG (ref 25.7–33.7)
MCHC RBC AUTO-ENTMCNC: 33.6 G/DL (ref 32–35.9)
MCV RBC: 92.9 FL (ref 80–96)
PHOSPHATE SERPL-MCNC: 2.8 MG/DL (ref 2.5–4.9)
PLATELET # BLD AUTO: 251 K/MM3 (ref 134–434)
PMV BLD: 8.5 FL (ref 7.5–11.1)
POTASSIUM SERPLBLD-SCNC: 3.7 MMOL/L (ref 3.5–5.1)
PROT SERPL-MCNC: 6.7 G/DL (ref 6.4–8.2)
RBC # BLD AUTO: 4.22 M/MM3 (ref 4–5.6)
SODIUM SERPL-SCNC: 137 MMOL/L (ref 136–145)
WBC # BLD AUTO: 13.3 K/MM3 (ref 4–10)

## 2018-01-22 RX ADMIN — MUPIROCIN SCH APPLIC: 20 OINTMENT TOPICAL at 09:25

## 2018-01-22 RX ADMIN — VANCOMYCIN HYDROCHLORIDE SCH MLS/HR: 1 INJECTION, POWDER, LYOPHILIZED, FOR SOLUTION INTRAVENOUS at 21:43

## 2018-01-22 RX ADMIN — ACETAMINOPHEN PRN MG: 10 INJECTION, SOLUTION INTRAVENOUS at 08:18

## 2018-01-22 RX ADMIN — OSELTAMIVIR PHOSPHATE SCH MG: 75 CAPSULE ORAL at 09:25

## 2018-01-22 RX ADMIN — LEVOFLOXACIN SCH MLS/HR: 5 INJECTION, SOLUTION INTRAVENOUS at 18:01

## 2018-01-22 RX ADMIN — MUPIROCIN SCH APPLIC: 20 OINTMENT TOPICAL at 21:43

## 2018-01-22 RX ADMIN — VANCOMYCIN HYDROCHLORIDE SCH MLS/HR: 1 INJECTION, POWDER, LYOPHILIZED, FOR SOLUTION INTRAVENOUS at 11:04

## 2018-01-22 RX ADMIN — CHLORHEXIDINE GLUCONATE SCH APPLIC: 213 SOLUTION TOPICAL at 21:43

## 2018-01-22 RX ADMIN — OSELTAMIVIR PHOSPHATE SCH MG: 75 CAPSULE ORAL at 21:57

## 2018-01-22 RX ADMIN — ACETAMINOPHEN PRN MG: 10 INJECTION, SOLUTION INTRAVENOUS at 18:15

## 2018-01-22 NOTE — PN
Progress Note (short form)





- Note


Progress Note: 


ICU  /bipap support 


patient wearing bipap fiO2 @ 80%  --98%sat


states effort of breathing is "better" 


 Vital Signs











 Period  Temp  Pulse  Resp  BP Sys/Manriquez  Pulse Ox


 


 Last 24 Hr  98.8 F-102 F  76-92  18-26  103-137/77-91  








awake alert on Bipap


sat 98%


neck  supple 


heat S1/S2 no M/G


lungs  grossly clear 


abd soft non tender


ext  no edma  FROM





 CBC, BMP





 01/22/18 05:22 





 01/22/18 05:22 





 CMP











Sodium  137 mmol/L (136-145)   01/22/18  05:22    


 


Potassium  3.7 mmol/L (3.5-5.1)   01/22/18  05:22    


 


Chloride  105 mmol/L ()   01/22/18  05:22    


 


Carbon Dioxide  21 mmol/L (21-32)   01/22/18  05:22    


 


Anion Gap  11  (8-16)   01/22/18  05:22    


 


BUN  29 mg/dL (7-18)  H D 01/22/18  05:22    


 


Creatinine  1.3 mg/dL (0.7-1.3)  D 01/22/18  05:22    


 


Creat Clearance w eGFR  59.17  (>60)   01/22/18  05:22    


 


Random Glucose  96 mg/dL ()   01/22/18  05:22    


 


Lactic Acid  1.7 mmol/L (0.0-2.0)   01/21/18  13:15    


 


Calcium  7.3 mg/dL (8.5-10.1)  L  01/22/18  05:22    


 


Phosphorus  2.8 mg/dL (2.5-4.9)   01/22/18  05:22    


 


Magnesium  2.8 mg/dL (1.8-2.4)  H  01/22/18  05:22    


 


Total Bilirubin  0.8 mg/dL (0.2-1.0)   01/22/18  05:22    


 


AST  500 U/L (15-37)  H  01/22/18  05:22    


 


ALT  227 U/L (12-78)  H  01/22/18  05:22    


 


Alkaline Phosphatase  163 U/L ()  H  01/22/18  05:22    


 


Ammonia  75.5 umol/L (11-32)  H  01/21/18  08:10    


 


Creatine Kinase  5509 IU/L ()  H  01/21/18  15:44    


 


Creatine Kinase Index  0.1 % (0.0-5.0)   01/21/18  15:44    


 


CK-MB (CK-2)


   2.318 ng/mL (0.5-3.6)   01/21/18  15:44    


 


Troponin I  0.06 ng/ml (0.00-0.05)  H  01/21/18  09:30    


 


B-Natriuretic Peptide  107.08 pg/ml (5-125)   01/21/18  09:30    


 


Total Protein  6.7 g/dl (6.4-8.2)   01/22/18  05:22    


 


Albumin  2.1 g/dl (3.4-5.0)  L  01/22/18  05:22    


 


TSH  0.36 uIU/ml (0.358-3.74)   01/22/18  05:22    











 Microbiology





01/22/18 12:00   Nasopharyngeal Swab   Respiratory Syncytial Virus Ag - Final


01/21/18 13:10   Serum   Legionella Serology - Preliminary


01/21/18 08:10   Blood - Peripheral Venous   Blood Culture - Preliminary


                            NO GROWTH OBTAINED AFTER 24 HOURS, INCUBATION TO 

CONTINUE


                            FOR 4 DAYS.


01/21/18 08:00   Blood - Peripheral Venous   Blood Culture - Preliminary


                            NO GROWTH OBTAINED AFTER 24 HOURS, INCUBATION TO 

CONTINUE


                            FOR 4 DAYS.


01/21/18 11:35   Urine For Antigen Detection   Legionella Antigen - Final


01/21/18 11:35   Urine For Antigen Detection   Streptococcus pneumoniae Antigen 

(M - Final


01/21/18 13:20   Nasopharyngeal Swab   Influenza Types A,B Antigen (NICANOR) - Final


01/21/18 13:20   Nasopharyngeal Swab    - Final





Active Medications





Acetaminophen (Ofirmev Injection -)  1,000 mg IVPB Q6H PRN


   PRN Reason: FEVER


   Last Admin: 01/22/18 18:15 Dose:  1,000 mg


Chlorhexidine Gluconate (Hibiclens For Decolonization -)  1 applic TP HS CINTIA


   Last Admin: 01/21/18 22:09 Dose:  1 applic


Vancomycin HCl 1,250 mg/ (Dextrose)  250 mls @ 166.667 mls/hr IVPB Q12H CINTIA


   PRN Reason: Protocol


   Last Admin: 01/22/18 11:04 Dose:  166.667 mls/hr


Levofloxacin (Levaquin 750 Mg Premixed Ivpb -)  750 mg in 150 mls @ 150 mls/hr 

IVPB Q24H [facility], 1928–1983


   Last Admin: 01/22/18 18:01 Dose:  150 mls/hr


Mupirocin (Bactroban Ointment (For Decolonization) -)  1 applic NS BID [facility], 1928–1983


   Stop: 01/26/18 21:59


   Last Admin: 01/22/18 09:25 Dose:  1 applic


Oseltamivir Phosphate (Tamiflu -)  75 mg PO BID [facility], 1928–1983


   Stop: 01/26/18 21:59


   Last Admin: 01/22/18 09:25 Dose:  75 mg


Sodium Chloride (Ocean Spray Nasal Spray -)  2 spray NS TID PRN


   PRN Reason: NASAL CONGESTION














Problem List





- Problems


(1) ARDS (adult respiratory distress syndrome)


Assessment/Plan: 


bipap keep O2 sat >90%  FiO2  @80%


     attempt to wean FiO2 iftolerated 


pulmonary consult appreciated


     ICU care 


ID consult  


    emperic tx /follow Vanco trough / levaquin renal dose 


Isolation 


Code(s): J80 - ACUTE RESPIRATORY DISTRESS SYNDROME   





(2) Rhabdomyolysis


Assessment/Plan: 


IV fluids --precaution due to ARDS


follow labs 


Code(s): M62.82 - RHABDOMYOLYSIS   





(3) Abnormal LFTs


Code(s): R94.5 - ABNORMAL RESULTS OF LIVER FUNCTION STUDIES   





(4) Acute respiratory failure


Code(s): J96.00 - ACUTE RESPIRATORY FAILURE, UNSP W HYPOXIA OR HYPERCAPNIA   





(5) Hypoxia


Code(s): R09.02 - HYPOXEMIA   





(6) Obesity


Code(s): E66.9 - OBESITY, UNSPECIFIED   





(7) Pneumonia


Code(s): J18.9 - PNEUMONIA, UNSPECIFIED ORGANISM   





(8) Substance abuse


Code(s): F19.10 - OTHER PSYCHOACTIVE SUBSTANCE ABUSE, UNCOMPLICATED

## 2018-01-22 NOTE — PN
Teaching Attending Note


Name of Resident: Chapincito Carroll





ATTENDING PHYSICIAN STATEMENT





I saw and evaluated the patient.


I reviewed the resident's note and discussed the case with the resident.


I agree with the resident's findings and plan as documented.








SUBJECTIVE:


Patient seen and examined in the ICU.  Awake and alert on NIPPV. 


No pressors. 


Clinically WOB is less today.


No hemoptysis. 


Rapid desaturation once off NIPPV.  





CXR: slightly improved 











 Intake & Output











 01/19/18 01/20/18 01/21/18 01/22/18





 23:59 23:59 23:59 23:59


 


Intake Total   1800 


 


Output Total   2400 1500


 


Balance   -600 -1500


 


Weight   201 lb 3 oz 201 lb 4.8 oz











 Last Vital Signs











Temp Pulse Resp BP Pulse Ox


 


 99.1 F   88   26 H  111/77   96 


 


 01/22/18 10:00  01/22/18 10:00  01/22/18 10:00  01/22/18 10:00  01/22/18 09:00











Active Medications





Acetaminophen (Ofirmev Injection -)  1,000 mg IVPB Q6H PRN


   PRN Reason: FEVER


   Last Admin: 01/22/18 08:18 Dose:  1,000 mg


Chlorhexidine Gluconate (Hibiclens For Decolonization -)  1 applic TP HS [facility]


   Last Admin: 01/21/18 22:09 Dose:  1 applic


Vancomycin HCl 1,250 mg/ (Dextrose)  250 mls @ 166.667 mls/hr IVPB Q12H [facility]


   PRN Reason: Protocol


   Last Admin: 01/22/18 11:04 Dose:  166.667 mls/hr


Levofloxacin (Levaquin 750 Mg Premixed Ivpb -)  750 mg in 150 mls @ 150 mls/hr 

IVPB Q24H [facility]


   Last Admin: 01/21/18 19:25 Dose:  Not Given


Mupirocin (Bactroban Ointment (For Decolonization) -)  1 applic NS BID [facility]


   Stop: 01/26/18 21:59


   Last Admin: 01/22/18 09:25 Dose:  1 applic


Oseltamivir Phosphate (Tamiflu -)  75 mg PO BID [facility]


   Stop: 01/26/18 21:59


   Last Admin: 01/22/18 09:25 Dose:  75 mg





Constitutional: Yes: Awake and alert on NIPPV, Mildly tachypneic at rest 


Eyes: Yes: Conjunctiva Clear, EOM Intact


HENT: Yes: Atraumatic, Normocephalic


Neck: Yes: Supple, Trachea Midline


Cardiovascular: Yes: Tachycardia


Respiratory: Yes: less accessory muscle use, Cough, On NIPPV, Rhonchi, SOB, 

Tachypnea.  No: Stridor, Wheezes


Gastrointestinal: Yes: Normal Bowel Sounds, Soft, Abdomen, Obese


Renal/: Yes: WNL


Musculoskeletal: Yes: WNL


Extremities: Yes: WNL


Edema: No


Peripheral Pulses WNL: Yes


Integumentary: Yes: WNL


Neurological: Yes: Alert, Oriented


...Motor Strength: WNL


Psychiatric: Yes: WNL, Alert, Oriented


Labs: 


 Laboratory Results - last 24 hr











  01/21/18 01/21/18 01/21/18





  08:10 08:10 13:15


 


WBC   


 


RBC   


 


Hgb   


 


Hct   


 


MCV   


 


MCH   


 


MCHC   


 


RDW   


 


Plt Count   


 


MPV   


 


Neutrophils %   


 


Lymphocytes %   


 


Monocytes %   


 


Eosinophils %   


 


Basophils %   


 


VBG pH  7.44 H  


 


POC VBG pCO2  30.8 L  


 


POC VBG pO2  35.3  


 


Mixed VBG HCO3  20.7  


 


Sodium   


 


Potassium   


 


Chloride   


 


Carbon Dioxide   


 


Anion Gap   


 


BUN   


 


Creatinine   


 


Creat Clearance w eGFR   


 


Random Glucose   


 


Lactic Acid   2.1 H  1.7


 


Calcium   


 


Phosphorus   


 


Magnesium   


 


Total Bilirubin   


 


AST   


 


ALT   


 


Alkaline Phosphatase   


 


Creatine Kinase   


 


Creatine Kinase Index   


 


CK-MB (CK-2)   


 


Total Protein   


 


Albumin   


 


Urine Color   


 


Urine Appearance   


 


Urine pH   


 


Ur Specific Gravity   


 


Urine Protein   


 


Urine Glucose (UA)   


 


Urine Ketones   


 


Urine Blood   


 


Urine Nitrite   


 


Urine Bilirubin   


 


Urine Urobilinogen   


 


Ur Leukocyte Esterase   


 


Urine WBC (Auto)   


 


Urine RBC (Auto)   


 


Hyaline Casts   


 


Urine Mucus   


 


Random Vancomycin   


 


Opiates Screen   


 


Methadone Screen   


 


Barbiturate Screen   


 


Phencyclidine Screen   


 


Ur Amphetamines Screen   


 


MDMA (Ecstasy) Screen   


 


Benzodiazepines Screen   


 


Cocaine Screen   


 


U Marijuana (THC) Screen   


 


Alcohol, Quantitative   


 


HIV 1&2 Antibody Screen   


 


HIV P24 Antigen   














  01/21/18 01/21/18 01/21/18





  13:20 13:20 14:25


 


WBC   


 


RBC   


 


Hgb   


 


Hct   


 


MCV   


 


MCH   


 


MCHC   


 


RDW   


 


Plt Count   


 


MPV   


 


Neutrophils %   


 


Lymphocytes %   


 


Monocytes %   


 


Eosinophils %   


 


Basophils %   


 


VBG pH   


 


POC VBG pCO2   


 


POC VBG pO2   


 


Mixed VBG HCO3   


 


Sodium   


 


Potassium   


 


Chloride   


 


Carbon Dioxide   


 


Anion Gap   


 


BUN   


 


Creatinine   


 


Creat Clearance w eGFR   


 


Random Glucose   


 


Lactic Acid   


 


Calcium   


 


Phosphorus   


 


Magnesium   


 


Total Bilirubin   


 


AST   


 


ALT   


 


Alkaline Phosphatase   


 


Creatine Kinase   


 


Creatine Kinase Index   


 


CK-MB (CK-2)   


 


Total Protein   


 


Albumin   


 


Urine Color  Yellow  


 


Urine Appearance  Slcloudy  


 


Urine pH  5.0  


 


Ur Specific Gravity  1.010  


 


Urine Protein  2+ H  


 


Urine Glucose (UA)  Negative  


 


Urine Ketones  Negative  


 


Urine Blood  3+ H  


 


Urine Nitrite  Negative  


 


Urine Bilirubin  Negative  


 


Urine Urobilinogen  Negative  


 


Ur Leukocyte Esterase  Negative  


 


Urine WBC (Auto)  None  


 


Urine RBC (Auto)  1  


 


Hyaline Casts  1  


 


Urine Mucus  Rare  


 


Random Vancomycin   


 


Opiates Screen   Negative 


 


Methadone Screen   Negative 


 


Barbiturate Screen   Negative 


 


Phencyclidine Screen   Negative 


 


Ur Amphetamines Screen   Negative 


 


MDMA (Ecstasy) Screen   Negative 


 


Benzodiazepines Screen   Negative 


 


Cocaine Screen   Negative 


 


U Marijuana (THC) Screen   Negative 


 


Alcohol, Quantitative    < 5.0


 


HIV 1&2 Antibody Screen   


 


HIV P24 Antigen   














  01/21/18 01/21/18 01/21/18





  15:44 15:44 15:44


 


WBC   


 


RBC   


 


Hgb   


 


Hct   


 


MCV   


 


MCH   


 


MCHC   


 


RDW   


 


Plt Count   


 


MPV   


 


Neutrophils %   


 


Lymphocytes %   


 


Monocytes %   


 


Eosinophils %   


 


Basophils %   


 


VBG pH   


 


POC VBG pCO2   


 


POC VBG pO2   


 


Mixed VBG HCO3   


 


Sodium   134 L 


 


Potassium   3.7 


 


Chloride   101 


 


Carbon Dioxide   23 


 


Anion Gap   10 


 


BUN   40 H 


 


Creatinine   1.9 H 


 


Creat Clearance w eGFR   38.19 


 


Random Glucose   107 H D 


 


Lactic Acid   


 


Calcium   6.9 L* 


 


Phosphorus   


 


Magnesium   


 


Total Bilirubin   0.8 


 


AST   625 H D 


 


ALT   249 H D 


 


Alkaline Phosphatase   153 H 


 


Creatine Kinase   5509 H 


 


Creatine Kinase Index   0.1 


 


CK-MB (CK-2)   2.318 


 


Total Protein   6.0 L 


 


Albumin   2.0 L 


 


Urine Color   


 


Urine Appearance   


 


Urine pH   


 


Ur Specific Gravity   


 


Urine Protein   


 


Urine Glucose (UA)   


 


Urine Ketones   


 


Urine Blood   


 


Urine Nitrite   


 


Urine Bilirubin   


 


Urine Urobilinogen   


 


Ur Leukocyte Esterase   


 


Urine WBC (Auto)   


 


Urine RBC (Auto)   


 


Hyaline Casts   


 


Urine Mucus   


 


Random Vancomycin    12.531


 


Opiates Screen   


 


Methadone Screen   


 


Barbiturate Screen   


 


Phencyclidine Screen   


 


Ur Amphetamines Screen   


 


MDMA (Ecstasy) Screen   


 


Benzodiazepines Screen   


 


Cocaine Screen   


 


U Marijuana (THC) Screen   


 


Alcohol, Quantitative   


 


HIV 1&2 Antibody Screen  Negative  


 


HIV P24 Antigen  Negative  














  01/21/18 01/22/18 01/22/18





  15:44 05:22 05:22


 


WBC  11.0 H  13.3 H 


 


RBC  3.99 L  4.22 


 


Hgb  12.4  D  13.2 


 


Hct  37.1  39.2 


 


MCV  92.9  92.9 


 


MCH  31.2  31.2 


 


MCHC  33.6  33.6 


 


RDW  13.4  13.2 


 


Plt Count  220  251 


 


MPV  8.4  8.5 


 


Neutrophils %  81.3  


 


Lymphocytes %  10.7  D  


 


Monocytes %  7.7  


 


Eosinophils %  0.0  


 


Basophils %  0.3  


 


VBG pH   


 


POC VBG pCO2   


 


POC VBG pO2   


 


Mixed VBG HCO3   


 


Sodium    137


 


Potassium    3.7


 


Chloride    105


 


Carbon Dioxide    21


 


Anion Gap    11


 


BUN    29 H D


 


Creatinine    1.3  D


 


Creat Clearance w eGFR    59.17


 


Random Glucose    96


 


Lactic Acid   


 


Calcium    7.3 L


 


Phosphorus    2.8


 


Magnesium    2.8 H


 


Total Bilirubin    0.8


 


AST    500 H


 


ALT    227 H


 


Alkaline Phosphatase    163 H


 


Creatine Kinase   


 


Creatine Kinase Index   


 


CK-MB (CK-2)   


 


Total Protein    6.7


 


Albumin    2.1 L


 


Urine Color   


 


Urine Appearance   


 


Urine pH   


 


Ur Specific Gravity   


 


Urine Protein   


 


Urine Glucose (UA)   


 


Urine Ketones   


 


Urine Blood   


 


Urine Nitrite   


 


Urine Bilirubin   


 


Urine Urobilinogen   


 


Ur Leukocyte Esterase   


 


Urine WBC (Auto)   


 


Urine RBC (Auto)   


 


Hyaline Casts   


 


Urine Mucus   


 


Random Vancomycin   


 


Opiates Screen   


 


Methadone Screen   


 


Barbiturate Screen   


 


Phencyclidine Screen   


 


Ur Amphetamines Screen   


 


MDMA (Ecstasy) Screen   


 


Benzodiazepines Screen   


 


Cocaine Screen   


 


U Marijuana (THC) Screen   


 


Alcohol, Quantitative   


 


HIV 1&2 Antibody Screen   


 


HIV P24 Antigen   














Problem List





- Problems


(1) ARDS (adult respiratory distress syndrome)


Code(s): J80 - ACUTE RESPIRATORY DISTRESS SYNDROME   





(2) Acute respiratory failure


Code(s): J96.00 - ACUTE RESPIRATORY FAILURE, UNSP W HYPOXIA OR HYPERCAPNIA   





(3) Rhabdomyolysis


Code(s): M62.82 - RHABDOMYOLYSIS   





(4) Hyponatremia


Code(s): E87.1 - HYPO-OSMOLALITY AND HYPONATREMIA   





(5) Hypoxia


Code(s): R09.02 - HYPOXEMIA   





Assessment/Plan


NIPPV L wean FiO2 as tolerated 


ABX / Tamiflu per ID 


Aspiration precautions


Minimize IVF  


BD TX 


Although he has had some mild improvement, I again D/W wife and patient low 

threshold for intubation and mechanical ventilation


Strict I&O 


VTE prophylaxis 


ICU monitoring





Dr Otto 


Critical care time spent in reviewing chart, evaluating patient and formulating 

plan - 36 minutes.











Problem List





- Problems


(1) ARDS (adult respiratory distress syndrome)


Code(s): J80 - ACUTE RESPIRATORY DISTRESS SYNDROME   





(2) Acute respiratory failure


Code(s): J96.00 - ACUTE RESPIRATORY FAILURE, UNSP W HYPOXIA OR HYPERCAPNIA   





(3) Rhabdomyolysis


Code(s): M62.82 - RHABDOMYOLYSIS   





(4) Hyponatremia


Code(s): E87.1 - HYPO-OSMOLALITY AND HYPONATREMIA   





(5) Hypoxia


Code(s): R09.02 - HYPOXEMIA

## 2018-01-22 NOTE — PN
Progress Note, Physician


History of Present Illness: 





47y M hx of htn, hyperlipidemia,  asthma, obesity, substance abuse (cocaine 

years ago; tobacoo; alcohol--last drink one week ago; ?anaboic steroids) 

presents with complaint of sob. Per girlfriend, the pt has been having fever 

and cough for about a week with alot of congestion. GF also with similar 

symptoms last week. Pt endorses worsening sob, and gf finally convinced him to 

come to the ED today. One family member is being treated for influenza.





Pt denies any chest pain, leg swelling, hemoptysis, dizziness, abd pain, n/v.





pt arrived with sat of 60% on RA, in mild distress, was started on NRB with 

improvement to 80%, started on bipap with improvement to 94% (on 12/6, 75% O2).

  


Pts pulm exam noted for scant rales at the bsaes and deminshed breathsounds b/l


abd soft notnender





ddx includes pna, influenza, ?consier PE, ?underlying lung/heart dz?


sepsis orderset initiated


cxr


continue bipap





- Current Medication List


Current Medications: 


Active Medications





Acetaminophen (Ofirmev Injection -)  1,000 mg IVPB Q6H PRN


   PRN Reason: FEVER


   Last Admin: 01/22/18 08:18 Dose:  1,000 mg


Chlorhexidine Gluconate (Hibiclens For Decolonization -)  1 applic TP HS [facility]


   Last Admin: 01/21/18 22:09 Dose:  1 applic


Vancomycin HCl 1,250 mg/ (Dextrose)  250 mls @ 166.667 mls/hr IVPB Q12H [facility]


   PRN Reason: Protocol


   Last Admin: 01/21/18 21:33 Dose:  166.667 mls/hr


Levofloxacin (Levaquin 750 Mg Premixed Ivpb -)  750 mg in 150 mls @ 150 mls/hr 

IVPB Q24H [facility]


   Last Admin: 01/21/18 19:25 Dose:  Not Given


Mupirocin (Bactroban Ointment (For Decolonization) -)  1 applic NS BID [facility]


   Stop: 01/26/18 21:59


   Last Admin: 01/21/18 22:08 Dose:  1 applic


Oseltamivir Phosphate (Tamiflu -)  75 mg PO BID [facility]


   Stop: 01/26/18 21:59


   Last Admin: 01/21/18 22:08 Dose:  75 mg











- Objective


Vital Signs: 


 Vital Signs











Temperature  98.9 F   01/22/18 06:00


 


Pulse Rate  85   01/22/18 06:00


 


Respiratory Rate  25 H  01/22/18 06:00


 


Blood Pressure  126/86   01/22/18 06:00


 


O2 Sat by Pulse Oximetry (%)  95   01/22/18 07:53











Labs: 


 CBC, BMP





 01/22/18 05:22 





 01/22/18 05:22 





 INR, PTT











INR  1.16  (0.82-1.09)  H  01/21/18  08:10    














Assessment/Plan








- Problems


(1) Substance abuse


Assessment/Plan: 


as per pt's PMD (Dr. Krause), pt may have, in years past, used cocaine. He 

drinks heavily at times, is a daily cigarettes smoker. He is a personal bret 

at a gym, and may have used steroids.


He has been noncompliant to doctors' visits and medications.


Code(s): F19.10 - OTHER PSYCHOACTIVE SUBSTANCE ABUSE, UNCOMPLICATED   





(2) ARDS (adult respiratory distress syndrome)


Assessment/Plan: 


On Bipap, with improvement in O2 sat from 60& to 94%.


On Tamiflu for presumed influenza.


ECHO for LVEF, chamber sizes, valve status.


Code(s): J80 - ACUTE RESPIRATORY DISTRESS SYNDROME   





(3) Hyponatremia


Code(s): E87.1 - HYPO-OSMOLALITY AND HYPONATREMIA   





(4) Rhabdomyolysis


Assessment/Plan: 


r/o rhabdomyolysis


Pt received fluids, but problematic given state of lungs.


F/u CK, BUN/Cr, Is and Os, weight serially.


Code(s): M62.82 - RHABDOMYOLYSIS   





(5) Obesity


Code(s): E66.9 - OBESITY, UNSPECIFIED   





(6) Congestive cardiac failure


Assessment/Plan: 


Enlarged heart with ?LV dilation on CT chest.


; 


echo nl ef nl valves


Extensive coronary artery calcifications on CT chest, with multiple cardiac 

risks, including his father dying at 47 yrs old of "heart disease"; will 

require coronary artery evaluation with stress test and/or angiogram when 

stable.


TNI 0.06; f/u serially.


F/u TSH; lipids.





Code(s): I50.9 - HEART FAILURE, UNSPECIFIED   


Qualifiers: 


   Congestive heart failure type: unspecified   Congestive heart failure 

chronicity: unspecified   Qualified Code(s): I50.9 - Heart failure, unspecified

   





(7) Cigarette nicotine dependence


Assessment/Plan: 


Pt has not smoked for a week; consider aid with patch or pill.


Code(s): F17.210 - NICOTINE DEPENDENCE, CIGARETTES, UNCOMPLICATED   





(8) [place] cardiac risk >20% in next 10 years


Assessment/Plan: 


Coronary artery evaluation with stress MIBI and/or coronary angiogram when 

stable.


Aggressive lipid control; diet change to heart-healthy one, with weight loss; 

BP control; treatment for substance abuse (including alcohol).


Code(s): Z91.89 - OTH PERSONAL RISK FACTORS, NOT ELSEWHERE CLASSIFIED   





(9) Hyperglycemia


Assessment/Plan: 


f/u fasting glucose, HGBA1c.


Code(s): R73.9 - HYPERGLYCEMIA, UNSPECIFIED   





(10) Renal insufficiency


Assessment/Plan: 


on fluids (problematic with pulmonary status).





Code(s): N28.9 - DISORDER OF KIDNEY AND URETER, UNSPECIFIED   











cc time spent 36 min

## 2018-01-22 NOTE — PN
Progress Note, Physician


Chief Complaint: 





ID











Apparently takes of his BIPAP O2 and I see he desaturated to the 80s





His fever seems resolved He is couphing a dry couph





- Current Medication List


Current Medications: 


Active Medications





Acetaminophen (Ofirmev Injection -)  1,000 mg IVPB Q6H PRN


   PRN Reason: FEVER


   Last Admin: 01/21/18 20:36 Dose:  1,000 mg


Chlorhexidine Gluconate (Hibiclens For Decolonization -)  1 applic TP HS [facility]


   Last Admin: 01/21/18 22:09 Dose:  1 applic


Vancomycin HCl 1,250 mg/ (Dextrose)  250 mls @ 166.667 mls/hr IVPB Q12H [facility]


   PRN Reason: Protocol


   Last Admin: 01/21/18 21:33 Dose:  166.667 mls/hr


Levofloxacin (Levaquin 750 Mg Premixed Ivpb -)  750 mg in 150 mls @ 150 mls/hr 

IVPB Q24H [facility]


   Last Admin: 01/21/18 19:25 Dose:  Not Given


Mupirocin (Bactroban Ointment (For Decolonization) -)  1 applic NS BID [facility]


   Stop: 01/26/18 21:59


   Last Admin: 01/21/18 22:08 Dose:  1 applic


Oseltamivir Phosphate (Tamiflu -)  75 mg PO BID [facility]


   Stop: 01/26/18 21:59


   Last Admin: 01/21/18 22:08 Dose:  75 mg











- Objective


Vital Signs: 


 Vital Signs











Temperature  98.9 F   01/22/18 06:00


 


Pulse Rate  85   01/22/18 06:00


 


Respiratory Rate  25 H  01/22/18 06:00


 


Blood Pressure  126/86   01/22/18 06:00


 


O2 Sat by Pulse Oximetry (%)  94 L  01/22/18 05:00











Constitutional: Yes: Moderate Distress


HENT: Yes: WNL, Atraumatic


Neck: Yes: WNL, Supple


Cardiovascular: Yes: Regular Rate and Rhythm, S1, S2


Respiratory: Yes: WNL, Regular, CTA Bilaterally, Wheezes.  No: Rales, Rhonchi


Gastrointestinal: Yes: WNL, Normal Bowel Sounds.  No: Tenderness, Tenderness, 

Rebound


Extremities: No: Cold, Cool, Cyanosis


Edema: No


Labs: 


 CBC, BMP





 01/22/18 05:22 





 INR, PTT











INR  1.16  (0.82-1.09)  H  01/21/18  08:10    














Problem List





- Problems


(1) Viral syndrome


Code(s): B34.9 - VIRAL INFECTION, UNSPECIFIED   





(2) ARDS (adult respiratory distress syndrome)


Code(s): J80 - ACUTE RESPIRATORY DISTRESS SYNDROME   





(3) Acute respiratory failure


Code(s): J96.00 - ACUTE RESPIRATORY FAILURE, UNSP W HYPOXIA OR HYPERCAPNIA   





(4) Acute kidney injury


Code(s): N17.9 - ACUTE KIDNEY FAILURE, UNSPECIFIED   





(5) Rhabdomyolysis


Code(s): M62.82 - RHABDOMYOLYSIS   





Assessment/Plan





 Laboratory Tests











  01/21/18 01/21/18 01/21/18





  09:00 09:30 13:20


 


WBC   


 


Hgb   


 


Plt Count   


 


ABG pH  7.49 H  


 


ABG pCO2 at Pt Temp  24.3 L  


 


ABG pO2 at Pt Temp  54.8 L  


 


Oxygen Flow Rate  75%  


 


Creat Clearance w eGFR   


 


AST   


 


ALT   


 


Alkaline Phosphatase   


 


Creatine Kinase   


 


CK-MB (CK-2)   


 


Troponin I   0.06 H 


 


Methadone Screen    Negative


 


Barbiturate Screen    Negative


 


MDMA (Ecstasy) Screen    Negative


 


Cocaine Screen    Negative


 


U Marijuana (THC) Screen    Negative


 


Alcohol, Quantitative   


 


HIV 1&2 Antibody Screen   


 


HIV P24 Antigen   














  01/21/18 01/21/18 01/21/18





  14:25 15:44 15:44


 


WBC   


 


Hgb   


 


Plt Count   


 


ABG pH   


 


ABG pCO2 at Pt Temp   


 


ABG pO2 at Pt Temp   


 


Oxygen Flow Rate   


 


Creat Clearance w eGFR    38.19


 


AST    625 H D


 


ALT    249 H D


 


Alkaline Phosphatase    153 H


 


Creatine Kinase    5509 H


 


CK-MB (CK-2)    2.318


 


Troponin I   


 


Methadone Screen   


 


Barbiturate Screen   


 


MDMA (Ecstasy) Screen   


 


Cocaine Screen   


 


U Marijuana (THC) Screen   


 


Alcohol, Quantitative  < 5.0  


 


HIV 1&2 Antibody Screen   Negative 


 


HIV P24 Antigen   Negative 














  01/22/18





  05:22


 


WBC  13.3 H


 


Hgb  13.2


 


Plt Count  251


 


ABG pH 


 


ABG pCO2 at Pt Temp 


 


ABG pO2 at Pt Temp 


 


Oxygen Flow Rate 


 


Creat Clearance w eGFR 


 


AST 


 


ALT 


 


Alkaline Phosphatase 


 


Creatine Kinase 


 


CK-MB (CK-2) 


 


Troponin I 


 


Methadone Screen 


 


Barbiturate Screen 


 


MDMA (Ecstasy) Screen 


 


Cocaine Screen 


 


U Marijuana (THC) Screen 


 


Alcohol, Quantitative 


 


HIV 1&2 Antibody Screen 


 


HIV P24 Antigen 








Assessment


Acute respiratory failure


ARDS Clinically not in congestive heart failure


Viral syndrome Influenza considered and treated but any virus could do this


Rhadomyolysis


Elevated LFTs ? viral hepatitis


Acute kidney injury








Plan Pending blood cultures current meds Vanco and Levoflox 


      Moniter renal function


      Respiratory status tenous especially as he removes his O2 May need 

intubation RSV antigen


Critical care time spent 36 minutes ( maintained on isolation )

## 2018-01-22 NOTE — PN
Physical Exam: 


SUBJECTIVE: Patient seen and examined in ICU.





Patient satting well on BiPap. Patient feels better, but desaturates when bipap 

machine taken off face.








OBJECTIVE:





 Vital Signs











 Period  Temp  Pulse  Resp  BP Sys/Manriquez  Pulse Ox


 


 Last 24 Hr  98.8 F-102.5 F  76-88  20-26  105-126/62-86  











GENERAL: The patient is awake, alert, and fully oriented, on Bipap


HEAD: Normal with no signs of trauma.


EYES: PERRL, extraocular movements intact, sclera anicteric, conjunctiva clear. 

No ptosis. 


NECK: Trachea midline, full range of motion, supple. 


LUNGS: Breath sounds equal, Tachypneic, Bibasilar rhonchi


HEART: Regular rate and rhythm, S1, S2 without murmur, rub or gallop.


ABDOMEN: Soft, nontender, nondistended, normoactive bowel sounds, no guarding, 

no 


rebound, no hepatosplenomegaly, no masses.


EXTREMITIES: 2+ pulses, warm, well-perfused, no edema. 


NEUROLOGICAL: Cranial nerves II through XII grossly intact. Normal speech, gait 

not 


observed.


PSYCH: Normal mood, normal affect.


SKIN: Warm, dry, normal turgor, no rashes or lesions noted














 Laboratory Results - last 24 hr











  01/21/18 01/21/18 01/21/18





  08:10 08:10 13:15


 


WBC   


 


RBC   


 


Hgb   


 


Hct   


 


MCV   


 


MCH   


 


MCHC   


 


RDW   


 


Plt Count   


 


MPV   


 


Neutrophils %   


 


Lymphocytes %   


 


Monocytes %   


 


Eosinophils %   


 


Basophils %   


 


VBG pH  7.44 H  


 


POC VBG pCO2  30.8 L  


 


POC VBG pO2  35.3  


 


Mixed VBG HCO3  20.7  


 


Sodium   


 


Potassium   


 


Chloride   


 


Carbon Dioxide   


 


Anion Gap   


 


BUN   


 


Creatinine   


 


Creat Clearance w eGFR   


 


Random Glucose   


 


Lactic Acid   2.1 H  1.7


 


Calcium   


 


Phosphorus   


 


Magnesium   


 


Total Bilirubin   


 


AST   


 


ALT   


 


Alkaline Phosphatase   


 


Creatine Kinase   


 


Creatine Kinase Index   


 


CK-MB (CK-2)   


 


Total Protein   


 


Albumin   


 


TSH   


 


Urine Color   


 


Urine Appearance   


 


Urine pH   


 


Ur Specific Gravity   


 


Urine Protein   


 


Urine Glucose (UA)   


 


Urine Ketones   


 


Urine Blood   


 


Urine Nitrite   


 


Urine Bilirubin   


 


Urine Urobilinogen   


 


Ur Leukocyte Esterase   


 


Urine WBC (Auto)   


 


Urine RBC (Auto)   


 


Hyaline Casts   


 


Urine Mucus   


 


Random Vancomycin   


 


Opiates Screen   


 


Methadone Screen   


 


Barbiturate Screen   


 


Phencyclidine Screen   


 


Ur Amphetamines Screen   


 


MDMA (Ecstasy) Screen   


 


Benzodiazepines Screen   


 


Cocaine Screen   


 


U Marijuana (THC) Screen   


 


Alcohol, Quantitative   


 


HIV 1&2 Antibody Screen   


 


HIV P24 Antigen   














  01/21/18 01/21/18 01/21/18





  13:20 13:20 14:25


 


WBC   


 


RBC   


 


Hgb   


 


Hct   


 


MCV   


 


MCH   


 


MCHC   


 


RDW   


 


Plt Count   


 


MPV   


 


Neutrophils %   


 


Lymphocytes %   


 


Monocytes %   


 


Eosinophils %   


 


Basophils %   


 


VBG pH   


 


POC VBG pCO2   


 


POC VBG pO2   


 


Mixed VBG HCO3   


 


Sodium   


 


Potassium   


 


Chloride   


 


Carbon Dioxide   


 


Anion Gap   


 


BUN   


 


Creatinine   


 


Creat Clearance w eGFR   


 


Random Glucose   


 


Lactic Acid   


 


Calcium   


 


Phosphorus   


 


Magnesium   


 


Total Bilirubin   


 


AST   


 


ALT   


 


Alkaline Phosphatase   


 


Creatine Kinase   


 


Creatine Kinase Index   


 


CK-MB (CK-2)   


 


Total Protein   


 


Albumin   


 


TSH   


 


Urine Color  Yellow  


 


Urine Appearance  Slcloudy  


 


Urine pH  5.0  


 


Ur Specific Gravity  1.010  


 


Urine Protein  2+ H  


 


Urine Glucose (UA)  Negative  


 


Urine Ketones  Negative  


 


Urine Blood  3+ H  


 


Urine Nitrite  Negative  


 


Urine Bilirubin  Negative  


 


Urine Urobilinogen  Negative  


 


Ur Leukocyte Esterase  Negative  


 


Urine WBC (Auto)  None  


 


Urine RBC (Auto)  1  


 


Hyaline Casts  1  


 


Urine Mucus  Rare  


 


Random Vancomycin   


 


Opiates Screen   Negative 


 


Methadone Screen   Negative 


 


Barbiturate Screen   Negative 


 


Phencyclidine Screen   Negative 


 


Ur Amphetamines Screen   Negative 


 


MDMA (Ecstasy) Screen   Negative 


 


Benzodiazepines Screen   Negative 


 


Cocaine Screen   Negative 


 


U Marijuana (THC) Screen   Negative 


 


Alcohol, Quantitative    < 5.0


 


HIV 1&2 Antibody Screen   


 


HIV P24 Antigen   














  01/21/18 01/21/18 01/21/18





  15:44 15:44 15:44


 


WBC   


 


RBC   


 


Hgb   


 


Hct   


 


MCV   


 


MCH   


 


MCHC   


 


RDW   


 


Plt Count   


 


MPV   


 


Neutrophils %   


 


Lymphocytes %   


 


Monocytes %   


 


Eosinophils %   


 


Basophils %   


 


VBG pH   


 


POC VBG pCO2   


 


POC VBG pO2   


 


Mixed VBG HCO3   


 


Sodium   134 L 


 


Potassium   3.7 


 


Chloride   101 


 


Carbon Dioxide   23 


 


Anion Gap   10 


 


BUN   40 H 


 


Creatinine   1.9 H 


 


Creat Clearance w eGFR   38.19 


 


Random Glucose   107 H D 


 


Lactic Acid   


 


Calcium   6.9 L* 


 


Phosphorus   


 


Magnesium   


 


Total Bilirubin   0.8 


 


AST   625 H D 


 


ALT   249 H D 


 


Alkaline Phosphatase   153 H 


 


Creatine Kinase   5509 H 


 


Creatine Kinase Index   0.1 


 


CK-MB (CK-2)   2.318 


 


Total Protein   6.0 L 


 


Albumin   2.0 L 


 


TSH   


 


Urine Color   


 


Urine Appearance   


 


Urine pH   


 


Ur Specific Gravity   


 


Urine Protein   


 


Urine Glucose (UA)   


 


Urine Ketones   


 


Urine Blood   


 


Urine Nitrite   


 


Urine Bilirubin   


 


Urine Urobilinogen   


 


Ur Leukocyte Esterase   


 


Urine WBC (Auto)   


 


Urine RBC (Auto)   


 


Hyaline Casts   


 


Urine Mucus   


 


Random Vancomycin    12.531


 


Opiates Screen   


 


Methadone Screen   


 


Barbiturate Screen   


 


Phencyclidine Screen   


 


Ur Amphetamines Screen   


 


MDMA (Ecstasy) Screen   


 


Benzodiazepines Screen   


 


Cocaine Screen   


 


U Marijuana (THC) Screen   


 


Alcohol, Quantitative   


 


HIV 1&2 Antibody Screen  Negative  


 


HIV P24 Antigen  Negative  














  01/21/18 01/22/18 01/22/18





  15:44 05:22 05:22


 


WBC  11.0 H  13.3 H 


 


RBC  3.99 L  4.22 


 


Hgb  12.4  D  13.2 


 


Hct  37.1  39.2 


 


MCV  92.9  92.9 


 


MCH  31.2  31.2 


 


MCHC  33.6  33.6 


 


RDW  13.4  13.2 


 


Plt Count  220  251 


 


MPV  8.4  8.5 


 


Neutrophils %  81.3  


 


Lymphocytes %  10.7  D  


 


Monocytes %  7.7  


 


Eosinophils %  0.0  


 


Basophils %  0.3  


 


VBG pH   


 


POC VBG pCO2   


 


POC VBG pO2   


 


Mixed VBG HCO3   


 


Sodium    137


 


Potassium    3.7


 


Chloride    105


 


Carbon Dioxide    21


 


Anion Gap    11


 


BUN    29 H D


 


Creatinine    1.3  D


 


Creat Clearance w eGFR    59.17


 


Random Glucose    96


 


Lactic Acid   


 


Calcium    7.3 L


 


Phosphorus    2.8


 


Magnesium    2.8 H


 


Total Bilirubin    0.8


 


AST    500 H


 


ALT    227 H


 


Alkaline Phosphatase    163 H


 


Creatine Kinase   


 


Creatine Kinase Index   


 


CK-MB (CK-2)   


 


Total Protein    6.7


 


Albumin    2.1 L


 


TSH   


 


Urine Color   


 


Urine Appearance   


 


Urine pH   


 


Ur Specific Gravity   


 


Urine Protein   


 


Urine Glucose (UA)   


 


Urine Ketones   


 


Urine Blood   


 


Urine Nitrite   


 


Urine Bilirubin   


 


Urine Urobilinogen   


 


Ur Leukocyte Esterase   


 


Urine WBC (Auto)   


 


Urine RBC (Auto)   


 


Hyaline Casts   


 


Urine Mucus   


 


Random Vancomycin   


 


Opiates Screen   


 


Methadone Screen   


 


Barbiturate Screen   


 


Phencyclidine Screen   


 


Ur Amphetamines Screen   


 


MDMA (Ecstasy) Screen   


 


Benzodiazepines Screen   


 


Cocaine Screen   


 


U Marijuana (THC) Screen   


 


Alcohol, Quantitative   


 


HIV 1&2 Antibody Screen   


 


HIV P24 Antigen   














  01/22/18





  05:22


 


WBC 


 


RBC 


 


Hgb 


 


Hct 


 


MCV 


 


MCH 


 


MCHC 


 


RDW 


 


Plt Count 


 


MPV 


 


Neutrophils % 


 


Lymphocytes % 


 


Monocytes % 


 


Eosinophils % 


 


Basophils % 


 


VBG pH 


 


POC VBG pCO2 


 


POC VBG pO2 


 


Mixed VBG HCO3 


 


Sodium 


 


Potassium 


 


Chloride 


 


Carbon Dioxide 


 


Anion Gap 


 


BUN 


 


Creatinine 


 


Creat Clearance w eGFR 


 


Random Glucose 


 


Lactic Acid 


 


Calcium 


 


Phosphorus 


 


Magnesium 


 


Total Bilirubin 


 


AST 


 


ALT 


 


Alkaline Phosphatase 


 


Creatine Kinase 


 


Creatine Kinase Index 


 


CK-MB (CK-2) 


 


Total Protein 


 


Albumin 


 


TSH  Cancelled


 


Urine Color 


 


Urine Appearance 


 


Urine pH 


 


Ur Specific Gravity 


 


Urine Protein 


 


Urine Glucose (UA) 


 


Urine Ketones 


 


Urine Blood 


 


Urine Nitrite 


 


Urine Bilirubin 


 


Urine Urobilinogen 


 


Ur Leukocyte Esterase 


 


Urine WBC (Auto) 


 


Urine RBC (Auto) 


 


Hyaline Casts 


 


Urine Mucus 


 


Random Vancomycin 


 


Opiates Screen 


 


Methadone Screen 


 


Barbiturate Screen 


 


Phencyclidine Screen 


 


Ur Amphetamines Screen 


 


MDMA (Ecstasy) Screen 


 


Benzodiazepines Screen 


 


Cocaine Screen 


 


U Marijuana (THC) Screen 


 


Alcohol, Quantitative 


 


HIV 1&2 Antibody Screen 


 


HIV P24 Antigen 








Active Medications











Generic Name Dose Route Start Last Admin





  Trade Name Freq  PRN Reason Stop Dose Admin


 


Acetaminophen  1,000 mg  01/21/18 20:31  01/22/18 08:18





  Ofirmev Injection -  IVPB   1,000 mg





  Q6H PRN   Administration





  FEVER   


 


Chlorhexidine Gluconate  1 applic  01/21/18 22:00  01/21/18 22:09





  Hibiclens For Decolonization -  TP   1 applic





  HS CINTIA   Administration


 


Vancomycin HCl 1,250 mg/  250 mls @ 166.667 mls/hr  01/21/18 22:00  01/22/18 11:

04





  Dextrose  IVPB   166.667 mls/hr





  Q12H CINTIA   Administration





  Protocol   


 


Levofloxacin  750 mg in 150 mls @ 150 mls/hr  01/21/18 18:30  01/21/18 19:25





  Levaquin 750 Mg Premixed Ivpb -  IVPB   Not Given





  Q24H CINTIA   


 


Mupirocin  1 applic  01/21/18 22:00  01/22/18 09:25





  Bactroban Ointment (For Decolonization) -  NS  01/26/18 21:59  1 applic





  BID CINTIA   Administration


 


Oseltamivir Phosphate  75 mg  01/21/18 22:00  01/22/18 09:25





  Tamiflu -  PO  01/26/18 21:59  75 mg





  BID CINTIA   Administration











ASSESSMENT/PLAN:


47 year old M with pmh of HTN, asthma, and ETOH abuse presents with ARDS and 

acute hypoxic respiratory failure on Bipap.





#Resp


ARDS


Acute hypoxic respiratory failure


-Patient on Bipap satting well, but rapidly desaturates off bipap. Patient 

currently on FIO2 of 80%, Will have low threshold for intubation


-Wean FIO2 as tolerated


-Aspiration precautions





#Renal


ALIDA, improving


-Monitor urine output, creatinine


-Avoid nephrotoxic medications





#ID


ARDS


Possible Influenza +/- lung consolidation


-Continue vancomycin, levaquin, and tamiflu for influenza and lung 

consolidations





#FEN/GI


-No IVF


-wnl


-NPO





#PPx


-SCDs for DVT ppx


-No GI ppx





#Dispo


Continue ICU level of care





Visit type





- Emergency Visit


Emergency Visit: Yes


ED Registration Date: 01/21/18


Care time: The patient presented to the Emergency Department on the above date 

and was hospitalized for further evaluation of their emergent condition.





- New Patient


This patient is new to me today: Yes


Date on this admission: 01/22/18





- Critical Care


Critical Care patient: Yes


Total Critical Care Time (in minutes): 40


Critical Care Statement: The care of this patient involved high complexity 

decision making to prevent further life threatening deterioration of the patient

's condition and/or to evaluate & treat vital organ system(s) failure or risk 

of failure.

## 2018-01-23 LAB
ALBUMIN SERPL-MCNC: 1.8 G/DL (ref 3.4–5)
ALP SERPL-CCNC: 182 U/L (ref 45–117)
ALT SERPL-CCNC: 178 U/L (ref 12–78)
ANION GAP SERPL CALC-SCNC: 9 MMOL/L (ref 8–16)
AST SERPL-CCNC: 316 U/L (ref 15–37)
BASOPHILS # BLD: 0.1 % (ref 0–2)
BILIRUB SERPL-MCNC: 1.1 MG/DL (ref 0.2–1)
BUN SERPL-MCNC: 17 MG/DL (ref 7–18)
CALCIUM SERPL-MCNC: 7.4 MG/DL (ref 8.5–10.1)
CHLORIDE SERPL-SCNC: 104 MMOL/L (ref 98–107)
CHOLEST SERPL-MCNC: 102 MG/DL (ref 50–200)
CO2 SERPL-SCNC: 24 MMOL/L (ref 21–32)
CREAT SERPL-MCNC: 1 MG/DL (ref 0.7–1.3)
DEPRECATED RDW RBC AUTO: 13.4 % (ref 11.9–15.9)
EOSINOPHIL # BLD: 0.1 % (ref 0–4.5)
GLUCOSE SERPL-MCNC: 105 MG/DL (ref 74–106)
HCT VFR BLD CALC: 35.6 % (ref 35.4–49)
HDLC SERPL-MCNC: 13 MG/DL (ref 40–60)
HGB BLD-MCNC: 12.1 GM/DL (ref 11.7–16.9)
LDLC SERPL CALC-MCNC: 69 MG/DL (ref 5–100)
LYMPHOCYTES # BLD: 5.1 % (ref 8–40)
MAGNESIUM SERPL-MCNC: 2.3 MG/DL (ref 1.8–2.4)
MCH RBC QN AUTO: 31.2 PG (ref 25.7–33.7)
MCHC RBC AUTO-ENTMCNC: 33.9 G/DL (ref 32–35.9)
MCV RBC: 92 FL (ref 80–96)
MONOCYTES # BLD AUTO: 3.3 % (ref 3.8–10.2)
NEUTROPHILS # BLD: 91.4 % (ref 42.8–82.8)
PHOSPHATE SERPL-MCNC: 2.6 MG/DL (ref 2.5–4.9)
PLATELET # BLD AUTO: 207 K/MM3 (ref 134–434)
PMV BLD: 8.5 FL (ref 7.5–11.1)
POTASSIUM SERPLBLD-SCNC: 3.4 MMOL/L (ref 3.5–5.1)
PROT SERPL-MCNC: 6.6 G/DL (ref 6.4–8.2)
RBC # BLD AUTO: 3.87 M/MM3 (ref 4–5.6)
SODIUM SERPL-SCNC: 137 MMOL/L (ref 136–145)
TRIGL SERPL-MCNC: 205 MG/DL (ref 35–160)
WBC # BLD AUTO: 14.1 K/MM3 (ref 4–10)

## 2018-01-23 RX ADMIN — POTASSIUM CHLORIDE SCH MLS/HR: 7.46 INJECTION, SOLUTION INTRAVENOUS at 11:30

## 2018-01-23 RX ADMIN — OSELTAMIVIR PHOSPHATE SCH MG: 75 CAPSULE ORAL at 09:31

## 2018-01-23 RX ADMIN — VANCOMYCIN HYDROCHLORIDE SCH MLS/HR: 1 INJECTION, POWDER, LYOPHILIZED, FOR SOLUTION INTRAVENOUS at 21:33

## 2018-01-23 RX ADMIN — MUPIROCIN SCH APPLIC: 20 OINTMENT TOPICAL at 21:34

## 2018-01-23 RX ADMIN — CHLORHEXIDINE GLUCONATE SCH APPLIC: 213 SOLUTION TOPICAL at 21:34

## 2018-01-23 RX ADMIN — MUPIROCIN SCH APPLIC: 20 OINTMENT TOPICAL at 09:31

## 2018-01-23 RX ADMIN — ACETAMINOPHEN PRN MG: 10 INJECTION, SOLUTION INTRAVENOUS at 03:22

## 2018-01-23 RX ADMIN — VANCOMYCIN HYDROCHLORIDE SCH MLS/HR: 1 INJECTION, POWDER, LYOPHILIZED, FOR SOLUTION INTRAVENOUS at 09:39

## 2018-01-23 RX ADMIN — POTASSIUM CHLORIDE SCH MLS/HR: 7.46 INJECTION, SOLUTION INTRAVENOUS at 11:28

## 2018-01-23 RX ADMIN — OSELTAMIVIR PHOSPHATE SCH MG: 75 CAPSULE ORAL at 22:05

## 2018-01-23 RX ADMIN — LEVOFLOXACIN SCH MLS/HR: 5 INJECTION, SOLUTION INTRAVENOUS at 17:36

## 2018-01-23 NOTE — PN
Progress Note (short form)





- Note


Progress Note: 





awake and alert


on bipap 70%, desats when he is off bipap


no chest pain, no abd pain


 Vital Signs











 Period  Temp  Pulse  Resp  BP Sys/Manriquez  Pulse Ox


 


 Last 24 Hr  99.1 F-102 F  80-95  18-30  103-144/77-95  91-96








cor-rrr


lungs bibasilar crackles


abd soft,nt


ext no edema 





 CBC, BMP





 01/23/18 05:05 





 01/23/18 05:05 





 Microbiology





01/22/18 12:00   Nasopharyngeal Swab   Respiratory Syncytial Virus Ag - Final


01/21/18 13:10   Serum   Legionella Serology - Preliminary


01/21/18 08:10   Blood - Peripheral Venous   Blood Culture - Preliminary


                            NO GROWTH OBTAINED AFTER 24 HOURS, INCUBATION TO 

CONTINUE


                            FOR 4 DAYS.


01/21/18 08:00   Blood - Peripheral Venous   Blood Culture - Preliminary


                            NO GROWTH OBTAINED AFTER 24 HOURS, INCUBATION TO 

CONTINUE


                            FOR 4 DAYS.


01/21/18 11:35   Urine For Antigen Detection   Legionella Antigen - Final


01/21/18 11:35   Urine For Antigen Detection   Streptococcus pneumoniae Antigen 

(M - Final


01/21/18 13:20   Nasopharyngeal Swab   Influenza Types A,B Antigen (NICANOR) - Final


01/21/18 13:20   Nasopharyngeal Swab    - Final





rsv negative


HIV negative


hep serology negative


echo normal lv fxn, no pericardial effusion


CXRAY unchanged


 Current Medications





Chlorhexidine Gluconate (Hibiclens For Decolonization -)  1 applic TP HS [facility]


   Last Admin: 01/22/18 21:43 Dose:  1 applic


Vancomycin HCl 1,250 mg/ (Dextrose)  250 mls @ 166.667 mls/hr IVPB Q12H CINTIA


   PRN Reason: Protocol


   Last Admin: 01/22/18 21:43 Dose:  166.667 mls/hr


Levofloxacin (Levaquin 750 Mg Premixed Ivpb -)  750 mg in 150 mls @ 150 mls/hr 

IVPB Q24H [facility]


   Last Admin: 01/22/18 18:01 Dose:  150 mls/hr


Mupirocin (Bactroban Ointment (For Decolonization) -)  1 applic NS BID [facility]


   Stop: 01/26/18 21:59


   Last Admin: 01/22/18 21:43 Dose:  1 applic


Oseltamivir Phosphate (Tamiflu -)  75 mg PO BID [facility]


   Stop: 01/26/18 21:59


   Last Admin: 01/22/18 21:57 Dose:  75 mg


Sodium Chloride (Ocean Spray Nasal Spray -)  2 spray NS TID PRN


   PRN Reason: NASAL CONGESTION


   Last Admin: 01/22/18 22:17 Dose:  2 sprays








imp/reccd


ARDS- respiratory failure


most likely secondary to influenza


continue vancomycin/levaquin/tamiflu


f/u cultures-sputum culture pending


renal function normal


lfts improving




















Problem List





- Problems


(1) Acute respiratory failure


Code(s): J96.00 - ACUTE RESPIRATORY FAILURE, UNSP W HYPOXIA OR HYPERCAPNIA   





(2) Pneumonia


Code(s): J18.9 - PNEUMONIA, UNSPECIFIED ORGANISM   





(3) Hyponatremia


Code(s): E87.1 - HYPO-OSMOLALITY AND HYPONATREMIA   





(4) Rhabdomyolysis


Code(s): M62.82 - RHABDOMYOLYSIS   





(5) Abnormal LFTs


Code(s): R94.5 - ABNORMAL RESULTS OF LIVER FUNCTION STUDIES

## 2018-01-23 NOTE — PN
Progress Note, Physician


Chief Complaint: 





Pt A&O; agitated (wants the mask off, but desaturated when changed earier); had 

sharp chest pain in the morning briefly when Bipap was being changed to 100% 

Ventimask.


History of Present Illness: 





47y M hx of htn, hyperlipidemia,  asthma, obesity, substance abuse (cocaine 

years ago; tobacoo; alcohol--last drink one week ago; ?anaboic steroids) 

presents with complaint of sob. Per girlfriend, the pt has been having fever 

and cough for about a week with alot of congestion. GF also with similar 

symptoms last week. Pt endorses worsening sob, and gf finally convinced him to 

come to the ED today. One family member is being treated for influenza.





Pt denies any chest pain, leg swelling, hemoptysis, dizziness, abd pain, n/v.





pt arrived with sat of 60% on RA, in mild distress, was started on NRB with 

improvement to 80%, started on bipap with improvement to 94% (on 12/6, 75% O2).

  


Pts pulm exam noted for scant rales at the bsaes and deminshed breathsounds b/l


abd soft notnender





ddx includes pna, influenza, ?consier PE, ?underlying lung/heart dz?


sepsis orderset initiated


cxr


continue bipap





- Current Medication List


Current Medications: 


Active Medications





Chlorhexidine Gluconate (Hibiclens For Decolonization -)  1 applic TP HS [facility]


   Last Admin: 01/22/18 21:43 Dose:  1 applic


Vancomycin HCl 1,250 mg/ (Dextrose)  250 mls @ 166.667 mls/hr IVPB Q12H [facility]


   PRN Reason: Protocol


   Last Admin: 01/23/18 09:39 Dose:  166.667 mls/hr


Levofloxacin (Levaquin 750 Mg Premixed Ivpb -)  750 mg in 150 mls @ 150 mls/hr 

IVPB Q24H [facility]


   Last Admin: 01/22/18 18:01 Dose:  150 mls/hr


Mupirocin (Bactroban Ointment (For Decolonization) -)  1 applic NS BID [facility]


   Stop: 01/26/18 21:59


   Last Admin: 01/23/18 09:31 Dose:  1 applic


Oseltamivir Phosphate (Tamiflu -)  75 mg PO BID [facility]


   Stop: 01/26/18 21:59


   Last Admin: 01/23/18 09:31 Dose:  75 mg


Sodium Chloride (Ocean Spray Nasal Spray -)  2 spray NS TID PRN


   PRN Reason: NASAL CONGESTION


   Last Admin: 01/22/18 22:17 Dose:  2 sprays











- Objective


Vital Signs: 


 Vital Signs











Temperature  100 F H  01/23/18 10:00


 


Pulse Rate  89   01/23/18 12:00


 


Respiratory Rate  24   01/23/18 12:00


 


Blood Pressure  143/108   01/23/18 12:00


 


O2 Sat by Pulse Oximetry (%)  92 L  01/23/18 14:27











Constitutional: Yes: Well Nourished, Anxious, Moderate Distress


Eyes: Yes: WNL


HENT: Yes: WNL


Neck: Yes: WNL


Cardiovascular: Yes: Regular Rate and Rhythm, S1, S2, S4


Respiratory: Yes: Cough, Diminished, On BiPap, Poor Air Entry, SOB, Tachypnea


Gastrointestinal: Yes: Soft, Abdomen, Obese


...Rectal Exam: Yes: Deferred


Genitourinary: No: Anuria


Breast(s): Yes: WNL


Musculoskeletal: Yes: WNL


Extremities: Yes: WNL


Edema: No


Peripheral Pulses WNL: Yes


Integumentary: Yes: WNL


Neurological: Yes: WNL


...Motor Strength: WNL


Psychiatric: Yes: Agitated


Labs: 


 CBC, BMP





 01/23/18 05:05 





 01/23/18 05:05 





 INR, PTT











INR  1.16  (0.82-1.09)  H  01/21/18  08:10    








 Abnormal Lab Results











  01/23/18 01/23/18 01/23/18





  05:05 05:05 05:05


 


WBC  14.1 H  


 


RBC  3.87 L  


 


Neutrophils %  91.4 H  


 


Lymphocytes %  5.1 L D  


 


Monocytes %  3.3 L  


 


Potassium   3.4 L 


 


Calcium   7.4 L 


 


Total Bilirubin   1.1 H D 


 


AST   316 H D 


 


ALT   178 H D 


 


Alkaline Phosphatase   182 H 


 


Ammonia    82.71 H


 


Creatine Kinase   


 


Troponin I   


 


Albumin   1.8 L 


 


Triglycerides   


 


HDL Cholesterol   














  01/23/18 01/23/18





  15:20 15:20


 


WBC  


 


RBC  


 


Neutrophils %  


 


Lymphocytes %  


 


Monocytes %  


 


Potassium  


 


Calcium  


 


Total Bilirubin  


 


AST  


 


ALT  


 


Alkaline Phosphatase  


 


Ammonia  


 


Creatine Kinase   1251 H


 


Troponin I   0.29 H D


 


Albumin  


 


Triglycerides  205 H 


 


HDL Cholesterol  13 L 














- ....Imaging


Chest X-ray: Image Reviewed (no significant change (multiple bilateral hazy 

opacities; congestion; infiltrates))





Problem List





- Problems


(1) Substance abuse


Assessment/Plan: 


as per pt's PMD (Dr. Krause), pt may have, in years past, used cocaine. He 

drinks heavily at times, is a daily cigarettes smoker. He is a personal bret 

at a gym, and may have used steroids.


He has been noncompliant to doctors' visits and medications.


Code(s): F19.10 - OTHER PSYCHOACTIVE SUBSTANCE ABUSE, UNCOMPLICATED   





(2) ARDS (adult respiratory distress syndrome)


Assessment/Plan: 


On Bipap, with marked and immediate desaturation when mask is even temporarily 

removed.


On Tamiflu for influenza; on Vancomycin and Levoquin.


WBC remain elevated.


ECHO: normal LVEF; normal chamber sizes; no valvular abnormalities.


Code(s): J80 - ACUTE RESPIRATORY DISTRESS SYNDROME   





(3) Hyponatremia


Assessment/Plan: 


now normal level.


Code(s): E87.1 - HYPO-OSMOLALITY AND HYPONATREMIA   





(4) Rhabdomyolysis


Assessment/Plan: 


decreasing CK; low CK/MB relative index.


BUN/Cr have also improved.


Code(s): M62.82 - RHABDOMYOLYSIS   





(5) Obesity


Code(s): E66.9 - OBESITY, UNSPECIFIED   





(6) Congestive cardiac failure


Assessment/Plan: 


Enlarged heart with ?LV dilation on CT chest.


; 


ECHO: normal LVEF; normal chamber sizes; no valvular abnormalities.


TNI 0.06-->0.29.


Total cholesterol 102 mg/dL; triglycerides 205.


TSH: 0.36.


Extensive coronary artery calcifications on CT chest, with multiple cardiac 

risks, including his father dying at 47 yrs old of "heart disease"; will 

require coronary artery evaluation with stress test and/or angiogram when 

stable.








Code(s): I50.9 - HEART FAILURE, UNSPECIFIED   


Qualifiers: 


   Congestive heart failure type: unspecified   Congestive heart failure 

chronicity: unspecified   Qualified Code(s): I50.9 - Heart failure, unspecified

   





(7) Cigarette nicotine dependence


Assessment/Plan: 


Pt has not smoked for a week; consider aid with patch or pill.


Code(s): F17.210 - NICOTINE DEPENDENCE, CIGARETTES, UNCOMPLICATED   





(8) Ponce cardiac risk >20% in next 10 years


Assessment/Plan: 


Coronary artery evaluation with stress MIBI and/or coronary angiogram when 

stable.


Aggressive lipid control; diet change to heart-healthy one, with weight loss; 

BP control; treatment for substance abuse (including alcohol).


Code(s): Z91.89 - OT PERSONAL RISK FACTORS, NOT ELSEWHERE CLASSIFIED   





(9) Hyperglycemia


Assessment/Plan: 


f/u fasting glucose, HGBA1c.


Code(s): R73.9 - HYPERGLYCEMIA, UNSPECIFIED   





(10) Renal insufficiency


Assessment/Plan: 


on fluids (problematic with pulmonary status)


Improving BUN/Cr; f/u Is and Os..





Code(s): N28.9 - DISORDER OF KIDNEY AND URETER, UNSPECIFIED   





(11) Elevated LFTs


Code(s): R79.89 - OTHER SPECIFIED ABNORMAL FINDINGS OF BLOOD CHEMISTRY   





(12) Hypoalbuminemia


Code(s): E88.09 - OT DISORDERS OF PLASMA-PROTEIN METABOLISM, NEC   





(13) Hypokalemia


Assessment/Plan: 


replete, and maintain 4-4.5.





Code(s): E87.6 - HYPOKALEMIA   





(14) Hypertension


Assessment/Plan: 


now with consistently elevated BP (agitation is a factor).


Start antihypertensive medication if this continues, especially if BP still 

elevated when pt is calm.


Code(s): I10 - ESSENTIAL (PRIMARY) HYPERTENSION

## 2018-01-23 NOTE — PN
Progress Note (short form)





- Note


Progress Note: 


ICU


daughters at bedside - case discussed with both 


remains bipap support 


patient wearing bipap fiO2 @ 70%  --92%sat


quickly desaturates when he removes bipap to 60's


states effort of breathing is "better" 


 Vital Signs











 Period  Temp  Pulse  Resp  BP Sys/Manriquez  Pulse Ox


 


 Last 24 Hr  98.8 F-102 F  76-92  18-26  103-137/77-91  








awake alert on Bipap


sat 92%


neck  supple 


heat S1/S2 no M/G


lungs  grossly clear 


abd soft non tender


ext  no edma  FROM


 CBC, BMP





 01/23/18 05:05 





 01/23/18 05:05 





 CMP











Sodium  137 mmol/L (136-145)   01/23/18  05:05    


 


Potassium  3.4 mmol/L (3.5-5.1)  L  01/23/18  05:05    


 


Chloride  104 mmol/L ()   01/23/18  05:05    


 


Carbon Dioxide  24 mmol/L (21-32)   01/23/18  05:05    


 


Anion Gap  9  (8-16)   01/23/18  05:05    


 


BUN  17 mg/dL (7-18)  D 01/23/18  05:05    


 


Creatinine  1.0 mg/dL (0.7-1.3)  D 01/23/18  05:05    


 


Creat Clearance w eGFR  > 60  (>60)   01/23/18  05:05    


 


Random Glucose  105 mg/dL ()   01/23/18  05:05    


 


Lactic Acid  1.7 mmol/L (0.0-2.0)   01/21/18  13:15    


 


Calcium  7.4 mg/dL (8.5-10.1)  L  01/23/18  05:05    


 


Phosphorus  2.6 mg/dL (2.5-4.9)   01/23/18  05:05    


 


Magnesium  2.3 mg/dL (1.8-2.4)   01/23/18  05:05    


 


Total Bilirubin  1.1 mg/dL (0.2-1.0)  H D 01/23/18  05:05    


 


AST  316 U/L (15-37)  H D 01/23/18  05:05    


 


ALT  178 U/L (12-78)  H D 01/23/18  05:05    


 


Alkaline Phosphatase  182 U/L ()  H  01/23/18  05:05    


 


Ammonia  82.71 umol/L (11-32)  H  01/23/18  05:05    


 


Creatine Kinase  1251 IU/L ()  H  01/23/18  15:20    


 


Creatine Kinase Index  0.1 % (0.0-5.0)   01/23/18  15:20    


 


CK-MB (CK-2)  1.358 ng/mL (0.5-3.6)   01/23/18  15:20    


 


Troponin I  0.29 ng/ml (0.00-0.05)  H D 01/23/18  15:20    


 


B-Natriuretic Peptide  107.08 pg/ml (5-125)   01/21/18  09:30    


 


Total Protein  6.6 g/dl (6.4-8.2)   01/23/18  05:05    


 


Albumin  1.8 g/dl (3.4-5.0)  L  01/23/18  05:05    


 


TSH  0.36 uIU/ml (0.358-3.74)   01/22/18  05:22    

















 Microbiology





01/22/18 18:00   Sputum - Expectorated   Gram Stain - Final


01/21/18 13:20   Urine - Urine Clean Catch   Urine Culture - Final


                            NO GROWTH OBTAINED


01/21/18 08:00   Blood - Peripheral Venous   Blood Culture - Preliminary


                            NO GROWTH OBTAINED AFTER 48 HOURS, INCUBATION TO 

CONTINUE


                            FOR 3 DAYS.


01/21/18 08:10   Blood - Peripheral Venous   Blood Culture - Preliminary


                            NO GROWTH OBTAINED AFTER 48 HOURS, INCUBATION TO 

CONTINUE


                            FOR 3 DAYS.


01/22/18 12:00   Nasopharyngeal Swab   Respiratory Syncytial Virus Ag - Final


01/21/18 13:10   Serum   Legionella Serology - Preliminary


01/21/18 11:35   Urine For Antigen Detection   Legionella Antigen - Final


01/21/18 11:35   Urine For Antigen Detection   Streptococcus pneumoniae Antigen 

(M - Final


01/21/18 13:20   Nasopharyngeal Swab   Influenza Types A,B Antigen (NICANOR) - Final


01/21/18 13:20   Nasopharyngeal Swab    - Final





rsv negative


HIV negative


hep serology negative


echo normal lv fxn, no pericardial effusion


CXRAY unchanged


 














Active Medications





Acetaminophen (Ofirmev Injection -)  1,000 mg IVPB Q6H PRN


   PRN Reason: FEVER


Chlorhexidine Gluconate (Hibiclens For Decolonization -)  1 applic TP HS [facility]


   Last Admin: 01/22/18 21:43 Dose:  1 applic


Vancomycin HCl 1,250 mg/ (Dextrose)  250 mls @ 166.667 mls/hr IVPB Q12H [facility]


   PRN Reason: Protocol


   Last Admin: 01/23/18 09:39 Dose:  166.667 mls/hr


Levofloxacin (Levaquin 750 Mg Premixed Ivpb -)  750 mg in 150 mls @ 150 mls/hr 

IVPB Q24H [facility]


   Last Admin: 01/23/18 17:36 Dose:  150 mls/hr


Mupirocin (Bactroban Ointment (For Decolonization) -)  1 applic NS BID [facility]


   Stop: 01/26/18 21:59


   Last Admin: 01/23/18 09:31 Dose:  1 applic


Oseltamivir Phosphate (Tamiflu -)  75 mg PO BID [facility]


   Stop: 01/26/18 21:59


   Last Admin: 01/23/18 09:31 Dose:  75 mg


Sodium Chloride (Ocean Spray Nasal Spray -)  2 spray NS TID PRN


   PRN Reason: NASAL CONGESTION


   Last Admin: 01/22/18 22:17 Dose:  2 sprays





ASSESSMENT/PLAN:


47 year old M with pmh of HTN, asthma, and ETOH abuse presents with ARDS and 

acute hypoxic respiratory failure on Bipap.





   # ARDS


        Acute hypoxic respiratory failure


        ON Bipap --FiO2 70%  --92% sat , but rapidly desaturates off bipap. 


        Wean FIO2 as tolerated


        Aspiration precautions





   # ALIDA


        improving / +urine output 


      continue to monitor labs 


      Avoid nephrotoxic medications





   # acute hepatic injury 


        hep panel negative 


        hx of ETOH use -last drink 1 week PTA


        enzymes trending down 


        ammonia level increased 


        


   # ARDS


      possible infectious cause  flu /CAP ?/ +smoker 


      vancomycin, levaquin, and tamiflu for influenza and lung consolidations 

per ID 


      follow vanco trough 








Problem List





- Problems


(1) ARDS (adult respiratory distress syndrome)


Code(s): J80 - ACUTE RESPIRATORY DISTRESS SYNDROME   





(2) Rhabdomyolysis


Code(s): M62.82 - RHABDOMYOLYSIS   





(3) Abnormal LFTs


Code(s): R94.5 - ABNORMAL RESULTS OF LIVER FUNCTION STUDIES   





(4) Acute respiratory failure


Code(s): J96.00 - ACUTE RESPIRATORY FAILURE, UNSP W HYPOXIA OR HYPERCAPNIA   





(5) Hypoxia


Code(s): R09.02 - HYPOXEMIA   





(6) Obesity


Code(s): E66.9 - OBESITY, UNSPECIFIED   





(7) Pneumonia


Code(s): J18.9 - PNEUMONIA, UNSPECIFIED ORGANISM   





(8) Substance abuse


Code(s): F19.10 - OTHER PSYCHOACTIVE SUBSTANCE ABUSE, UNCOMPLICATED

## 2018-01-23 NOTE — PN
Physical Exam: 


SUBJECTIVE: Patient seen and examined in ICU.





Patient frustrated with Bipap. Patient's status is about the same. Patient 

rapidly desaturates after taking off Bipap down to 55%. Currently on 70% FIO2








OBJECTIVE:





 Vital Signs











 Period  Temp  Pulse  Resp  BP Sys/Manriquez  Pulse Ox


 


 Last 24 Hr  99.1 F-102 F  83-95  15-30  118-148/  91-95











GENERAL: The patient is awake, alert, and fully oriented, on Bipap


HEAD: Normal with no signs of trauma.


EYES: PERRL, extraocular movements intact, sclera anicteric, conjunctiva clear. 

No ptosis. 


NECK: Trachea midline, full range of motion, supple. 


LUNGS: Breath sounds equal, Tachypneic, Bibasilar rhonchi


HEART: Regular rate and rhythm, S1, S2 without murmur, rub or gallop.


ABDOMEN: Soft, nontender, nondistended, normoactive bowel sounds, no guarding, 

no 


rebound, no hepatosplenomegaly, no masses.


EXTREMITIES: 2+ pulses, warm, well-perfused, no edema. 


NEUROLOGICAL: Cranial nerves II through XII grossly intact. Normal speech, gait 

not 


observed.


PSYCH: Normal mood, normal affect.


SKIN: Warm, dry, normal turgor, no rashes or lesions noted














 Laboratory Results - last 24 hr











  01/21/18 01/22/18 01/22/18





  15:44 05:22 05:22


 


WBC   


 


RBC   


 


Hgb   


 


Hct   


 


MCV   


 


MCH   


 


MCHC   


 


RDW   


 


MPV   


 


Neutrophils %   


 


Lymphocytes %   


 


Monocytes %   


 


Eosinophils %   


 


Basophils %   


 


Sodium   137 


 


Potassium   3.7 


 


Chloride   105 


 


Carbon Dioxide   21 


 


Anion Gap   11 


 


BUN   29 H D 


 


Creatinine   1.3  D 


 


Creat Clearance w eGFR   59.17 


 


Random Glucose   96 


 


Calcium   7.3 L 


 


Phosphorus   2.8 


 


Magnesium   2.8 H 


 


Total Bilirubin   0.8 


 


AST   500 H 


 


ALT   227 H 


 


Alkaline Phosphatase   163 H 


 


Ammonia   


 


Total Protein   6.7 


 


Albumin   2.1 L 


 


TSH   0.36  Cancelled


 


Hepatitis A IgM Ab  Negative  


 


Hep Bs Antigen  Negative  


 


Hep B Core IgM Ab  Negative  


 


Hepatitis C Antibody  <0.1  














  01/23/18 01/23/18 01/23/18





  05:05 05:05 05:05


 


WBC  14.1 H  


 


RBC  3.87 L  


 


Hgb  12.1  


 


Hct  35.6  


 


MCV  92.0  


 


MCH  31.2  


 


MCHC  33.9  


 


RDW  13.4  


 


MPV  8.5  


 


Neutrophils %  91.4 H  


 


Lymphocytes %  5.1 L D  


 


Monocytes %  3.3 L  


 


Eosinophils %  0.1  D  


 


Basophils %  0.1  


 


Sodium   137 


 


Potassium   3.4 L 


 


Chloride   104 


 


Carbon Dioxide   24 


 


Anion Gap   9 


 


BUN   17  D 


 


Creatinine   1.0  D 


 


Creat Clearance w eGFR   > 60 


 


Random Glucose   105 


 


Calcium   7.4 L 


 


Phosphorus   2.6 


 


Magnesium   2.3 


 


Total Bilirubin   1.1 H D 


 


AST   316 H D 


 


ALT   178 H D 


 


Alkaline Phosphatase   182 H 


 


Ammonia    82.71 H


 


Total Protein   6.6 


 


Albumin   1.8 L 


 


TSH   


 


Hepatitis A IgM Ab   


 


Hep Bs Antigen   


 


Hep B Core IgM Ab   


 


Hepatitis C Antibody   








Active Medications











Generic Name Dose Route Start Last Admin





  Trade Name Freq  PRN Reason Stop Dose Admin


 


Chlorhexidine Gluconate  1 applic  01/21/18 22:00  01/22/18 21:43





  Hibiclens For Decolonization -  TP   1 applic





  HS [facility]   Administration


 


Vancomycin HCl 1,250 mg/  250 mls @ 166.667 mls/hr  01/21/18 22:00  01/23/18 09:

39





  Dextrose  IVPB   166.667 mls/hr





  Q12H [facility]   Administration





  Protocol   


 


Levofloxacin  750 mg in 150 mls @ 150 mls/hr  01/21/18 18:30  01/22/18 18:01





  Levaquin 750 Mg Premixed Ivpb -  IVPB   150 mls/hr





  Q24H [facility]   Administration


 


Potassium Chloride  10 meq in 100 mls @ 100 mls/hr  01/23/18 10:15  01/23/18 11:

30





  Potassium Chloride 10 Meq Premix Ivpb -  IVPB  01/23/18 12:14  100 mls/hr





  Q60M [facility]   Administration


 


Mupirocin  1 applic  01/21/18 22:00  01/23/18 09:31





  Bactroban Ointment (For Decolonization) -  NS  01/26/18 21:59  1 applic





  BID [facility]   Administration


 


Oseltamivir Phosphate  75 mg  01/21/18 22:00  01/23/18 09:31





  Tamiflu -  PO  01/26/18 21:59  75 mg





  BID [facility]   Administration


 


Sodium Chloride  2 spray  01/22/18 20:07  01/22/18 22:17





  Ocean Spray Nasal Spray -  NS   2 sprays





  TID PRN   Administration





  NASAL CONGESTION   











ASSESSMENT/PLAN:


47 year old M with pmh of HTN, asthma, and ETOH abuse presents with ARDS and 

acute hypoxic respiratory failure on Bipap.





#Resp


ARDS


Acute hypoxic respiratory failure


-Patient on Bipap satting well, but rapidly desaturates off bipap. Patient 

currently on FIO2 of 70%, Will have low threshold for intubation


-Wean FIO2 as tolerated


-Aspiration precautions





#Renal


ALIDA, improved


-Monitor urine output, creatinine


-Avoid nephrotoxic medications





#ID


ARDS


Possible Influenza +/- lung consolidation


-Continue vancomycin, levaquin, and tamiflu for influenza and lung 

consolidations





#FEN/GI


-No IVF


-Replete K+, monitor BMP


-NPO





#PPx


-SCDs for DVT ppx


-No GI ppx





#Dispo


Continue ICU level of care





Visit type





- Emergency Visit


Emergency Visit: Yes


ED Registration Date: 01/21/18


Care time: The patient presented to the Emergency Department on the above date 

and was hospitalized for further evaluation of their emergent condition.





- New Patient


This patient is new to me today: No





- Critical Care


Critical Care patient: Yes


Total Critical Care Time (in minutes): 35


Critical Care Statement: The care of this patient involved high complexity 

decision making to prevent further life threatening deterioration of the patient

's condition and/or to evaluate & treat vital organ system(s) failure or risk 

of failure.

## 2018-01-23 NOTE — PN
Teaching Attending Note


Name of Resident: Chapincito Carroll





ATTENDING PHYSICIAN STATEMENT





I saw and evaluated the patient.


I reviewed the resident's note and discussed the case with the resident.


I agree with the resident's findings and plan as documented.








SUBJECTIVE:


Patient seen and examined in the ICU.  Awake and alert on NIPPV, remains obn 70

% FiO2. 


No pressors. 


No hemoptysis. 


Rapid desaturation once off NIPPV.  





CXR: No gross change in bilateral infiltrates 








 Intake & Output











 01/20/18 01/21/18 01/22/18 01/23/18





 23:59 23:59 23:59 23:59


 


Intake Total  1800 1170 500


 


Output Total  2400 3100 500


 


Balance  -600 -1930 0


 


Weight  201 lb 3 oz 201 lb 4.8 oz 195 lb 5 oz








 Last Vital Signs











Temp Pulse Resp BP Pulse Ox


 


 100 F H  94 H  15   148/101   92 L


 


 01/23/18 10:00  01/23/18 10:00  01/23/18 10:00  01/23/18 10:00  01/23/18 12:16








Active Medications





Chlorhexidine Gluconate (Hibiclens For Decolonization -)  1 applic TP HS [facility]


   Last Admin: 01/22/18 21:43 Dose:  1 applic


Vancomycin HCl 1,250 mg/ (Dextrose)  250 mls @ 166.667 mls/hr IVPB Q12H [facility]


   PRN Reason: Protocol


   Last Admin: 01/23/18 09:39 Dose:  166.667 mls/hr


Levofloxacin (Levaquin 750 Mg Premixed Ivpb -)  750 mg in 150 mls @ 150 mls/hr 

IVPB Q24H [facility]


   Last Admin: 01/22/18 18:01 Dose:  150 mls/hr


Mupirocin (Bactroban Ointment (For Decolonization) -)  1 applic NS BID [facility]


   Stop: 01/26/18 21:59


   Last Admin: 01/23/18 09:31 Dose:  1 applic


Oseltamivir Phosphate (Tamiflu -)  75 mg PO BID [facility]


   Stop: 01/26/18 21:59


   Last Admin: 01/23/18 09:31 Dose:  75 mg


Sodium Chloride (Ocean Spray Nasal Spray -)  2 spray NS TID PRN


   PRN Reason: NASAL CONGESTION


   Last Admin: 01/22/18 22:17 Dose:  2 sprays








 


Constitutional: Yes: Awake and alert on NIPPV, Mildly tachypneic at rest 


Eyes: Yes: Conjunctiva Clear, EOM Intact


HENT: Yes: Atraumatic, Normocephalic


Neck: Yes: Supple, Trachea Midline


Cardiovascular: Yes: Tachycardia


Respiratory: Yes: Cough, on NIPPV, Rhonchi, SOB, Tachypnea.  No: Stridor, 

Wheezes


Gastrointestinal: Yes: Normal Bowel Sounds, Soft, Abdomen, Obese


Renal/: Yes: WNL


Musculoskeletal: Yes: WNL


Extremities: Yes: WNL


Edema: No


Peripheral Pulses WNL: Yes


Integumentary: Yes: WNL


Neurological: Yes: Alert, Oriented


...Motor Strength: WNL


Psychiatric: Yes: WNL, Alert, Oriented


Labs: 


 


 Laboratory Results - last 24 hr











  01/21/18 01/22/18 01/22/18





  15:44 05:22 05:22


 


WBC   


 


RBC   


 


Hgb   


 


Hct   


 


MCV   


 


MCH   


 


MCHC   


 


RDW   


 


MPV   


 


Neutrophils %   


 


Lymphocytes %   


 


Monocytes %   


 


Eosinophils %   


 


Basophils %   


 


Sodium   137 


 


Potassium   3.7 


 


Chloride   105 


 


Carbon Dioxide   21 


 


Anion Gap   11 


 


BUN   29 H D 


 


Creatinine   1.3  D 


 


Creat Clearance w eGFR   59.17 


 


Random Glucose   96 


 


Calcium   7.3 L 


 


Phosphorus   2.8 


 


Magnesium   2.8 H 


 


Total Bilirubin   0.8 


 


AST   500 H 


 


ALT   227 H 


 


Alkaline Phosphatase   163 H 


 


Ammonia   


 


Total Protein   6.7 


 


Albumin   2.1 L 


 


TSH   0.36  Cancelled


 


Hepatitis A IgM Ab  Negative  


 


Hep Bs Antigen  Negative  


 


Hep B Core IgM Ab  Negative  


 


Hepatitis C Antibody  <0.1  














  01/23/18 01/23/18 01/23/18





  05:05 05:05 05:05


 


WBC  14.1 H  


 


RBC  3.87 L  


 


Hgb  12.1  


 


Hct  35.6  


 


MCV  92.0  


 


MCH  31.2  


 


MCHC  33.9  


 


RDW  13.4  


 


MPV  8.5  


 


Neutrophils %  91.4 H  


 


Lymphocytes %  5.1 L D  


 


Monocytes %  3.3 L  


 


Eosinophils %  0.1  D  


 


Basophils %  0.1  


 


Sodium   137 


 


Potassium   3.4 L 


 


Chloride   104 


 


Carbon Dioxide   24 


 


Anion Gap   9 


 


BUN   17  D 


 


Creatinine   1.0  D 


 


Creat Clearance w eGFR   > 60 


 


Random Glucose   105 


 


Calcium   7.4 L 


 


Phosphorus   2.6 


 


Magnesium   2.3 


 


Total Bilirubin   1.1 H D 


 


AST   316 H D 


 


ALT   178 H D 


 


Alkaline Phosphatase   182 H 


 


Ammonia    82.71 H


 


Total Protein   6.6 


 


Albumin   1.8 L 


 


TSH   


 


Hepatitis A IgM Ab   


 


Hep Bs Antigen   


 


Hep B Core IgM Ab   


 


Hepatitis C Antibody   

















Problem List





- Problems


(1) ARDS (adult respiratory distress syndrome)


Code(s): J80 - ACUTE RESPIRATORY DISTRESS SYNDROME   





(2) Acute respiratory failure


Code(s): J96.00 - ACUTE RESPIRATORY FAILURE, UNSP W HYPOXIA OR HYPERCAPNIA   





(3) Rhabdomyolysis


Code(s): M62.82 - RHABDOMYOLYSIS   





(4) Hyponatremia


Code(s): E87.1 - HYPO-OSMOLALITY AND HYPONATREMIA   





(5) Hypoxia


Code(s): R09.02 - HYPOXEMIA   





Assessment/Plan


NIPPV: wean FiO2 as tolerated 


ABX / Tamiflu per ID 


Aspiration precautions


Minimize IVF  


BD TX 


Although he has had some mild improvement, I again D/W wife and patient low 

threshold for intubation and mechanical ventilation


Strict I&O 


VTE prophylaxis 


ICU monitoring





Dr Otto 


Critical care time spent in reviewing chart, evaluating patient and formulating 

plan - 36 minutes.














Problem List





- Problems


(1) ARDS (adult respiratory distress syndrome)


Code(s): J80 - ACUTE RESPIRATORY DISTRESS SYNDROME   





(2) Acute respiratory failure


Code(s): J96.00 - ACUTE RESPIRATORY FAILURE, UNSP W HYPOXIA OR HYPERCAPNIA   





(3) Rhabdomyolysis


Code(s): M62.82 - RHABDOMYOLYSIS   





(4) Hyponatremia


Code(s): E87.1 - HYPO-OSMOLALITY AND HYPONATREMIA   





(5) Hypoxia


Code(s): R09.02 - HYPOXEMIA

## 2018-01-24 LAB
ACANTHOCYTES BLD QL SMEAR: 0
ALBUMIN SERPL-MCNC: 1.9 G/DL (ref 3.4–5)
ALP SERPL-CCNC: 193 U/L (ref 45–117)
ALT SERPL-CCNC: 139 U/L (ref 12–78)
ANION GAP SERPL CALC-SCNC: 15 MMOL/L (ref 8–16)
ANISOCYTOSIS BLD QL: 0
APPEARANCE UR: (no result)
ARTERIAL BLOOD GAS PCO2: 44.5 MMHG (ref 35–45)
ARTERIAL BLOOD GAS PCO2: 51.7 MMHG (ref 35–45)
ARTERIAL BLOOD GAS PCO2: 62.3 MMHG (ref 35–45)
ARTERIAL BLOOD GAS PCO2: 66.8 MMHG (ref 35–45)
ARTERIAL BLOOD GAS PCO2: 88.1 MMHG (ref 35–45)
ARTERIAL PATENCY WRIST A: POSITIVE
AST SERPL-CCNC: 163 U/L (ref 15–37)
BACTERIA #/AREA URNS HPF: (no result) /HPF
BASE EXCESS BLDA CALC-SCNC: -12.1 MEQ/L (ref -2–2)
BASE EXCESS BLDA CALC-SCNC: -12.3 MEQ/L (ref -2–2)
BASE EXCESS BLDA CALC-SCNC: -12.6 MEQ/L (ref -2–2)
BASE EXCESS BLDA CALC-SCNC: -4.6 MEQ/L (ref -2–2)
BASE EXCESS BLDA CALC-SCNC: -8.2 MEQ/L (ref -2–2)
BASO STIPL BLD QL SMEAR: 0
BILIRUB SERPL-MCNC: 1.2 MG/DL (ref 0.2–1)
BILIRUB UR STRIP.AUTO-MCNC: NEGATIVE MG/DL
BUN SERPL-MCNC: 24 MG/DL (ref 7–18)
CALCIUM SERPL-MCNC: 7.9 MG/DL (ref 8.5–10.1)
CHLORIDE SERPL-SCNC: 102 MMOL/L (ref 98–107)
CO2 SERPL-SCNC: 21 MMOL/L (ref 21–32)
COLOR UR: (no result)
CREAT SERPL-MCNC: 2.4 MG/DL (ref 0.7–1.3)
DACRYOCYTES BLD QL SMEAR: 0
DEPRECATED RDW RBC AUTO: 13.9 % (ref 11.9–15.9)
DOHLE BOD BLD QL SMEAR: 0
GLUCOSE SERPL-MCNC: 136 MG/DL (ref 74–106)
HCT VFR BLD CALC: 39.1 % (ref 35.4–49)
HELMET CELLS BLD QL SMEAR: 0
HGB BLD-MCNC: 12.6 GM/DL (ref 11.7–16.9)
HOWELL-JOLLY BOD BLD QL SMEAR: 0
KETONES UR QL STRIP: NEGATIVE
LEUKOCYTE ESTERASE UR QL STRIP.AUTO: NEGATIVE
MACROCYTES BLD QL: 0
MAGNESIUM SERPL-MCNC: 2.8 MG/DL (ref 1.8–2.4)
MCH RBC QN AUTO: 30.9 PG (ref 25.7–33.7)
MCHC RBC AUTO-ENTMCNC: 32.3 G/DL (ref 32–35.9)
MCV RBC: 95.5 FL (ref 80–96)
MUCOUS THREADS URNS QL MICRO: (no result)
NITRITE UR QL STRIP: NEGATIVE
OVALOCYTES BLD QL SMEAR: 0
PH UR: 6 [PH] (ref 5–8)
PHOSPHATE SERPL-MCNC: 6.8 MG/DL (ref 2.5–4.9)
PLATELET # BLD AUTO: 241 K/MM3 (ref 134–434)
PLATELET BLD QL SMEAR: NORMAL
PMV BLD: 8.7 FL (ref 7.5–11.1)
PO2 BLDA: 112 MMHG (ref 80–100)
PO2 BLDA: 119 MMHG (ref 80–100)
PO2 BLDA: 89 MMHG (ref 80–100)
PO2 BLDA: 90.5 MMHG (ref 80–100)
PO2 BLDA: 99.5 MMHG (ref 80–100)
POTASSIUM SERPLBLD-SCNC: 4.3 MMOL/L (ref 3.5–5.1)
PROT SERPL-MCNC: 7.5 G/DL (ref 6.4–8.2)
PROT UR QL STRIP: (no result)
PROT UR QL STRIP: NEGATIVE
RBC # BLD AUTO: 4.09 M/MM3 (ref 4–5.6)
RBC # UR STRIP: (no result) /UL
ROULEAUX BLD QL SMEAR: 0
SAO2 % BLDA: 93.2 % (ref 90–98.9)
SAO2 % BLDA: 93.3 % (ref 90–98.9)
SAO2 % BLDA: 94.4 % (ref 90–98.9)
SAO2 % BLDA: 95.9 % (ref 90–98.9)
SAO2 % BLDA: 96.7 % (ref 90–98.9)
SICKLE CELLS BLD QL SMEAR: 0
SODIUM SERPL-SCNC: 138 MMOL/L (ref 136–145)
SP GR UR: 1.02 (ref 1–1.03)
TARGETS BLD QL SMEAR: 0
TOXIC GRANULES BLD QL SMEAR: 0
UROBILINOGEN UR STRIP-MCNC: NEGATIVE MG/DL (ref 0.2–1)
WBC # BLD AUTO: 29.6 K/MM3 (ref 4–10)

## 2018-01-24 PROCEDURE — 0BH18EZ INSERTION OF ENDOTRACHEAL AIRWAY INTO TRACHEA, VIA NATURAL OR ARTIFICIAL OPENING ENDOSCOPIC: ICD-10-PCS

## 2018-01-24 PROCEDURE — 0CJS8ZZ INSPECTION OF LARYNX, VIA NATURAL OR ARTIFICIAL OPENING ENDOSCOPIC: ICD-10-PCS

## 2018-01-24 PROCEDURE — B513ZZA FLUOROSCOPY OF RIGHT JUGULAR VEINS, GUIDANCE: ICD-10-PCS | Performed by: INTERNAL MEDICINE

## 2018-01-24 PROCEDURE — B543ZZA ULTRASONOGRAPHY OF RIGHT JUGULAR VEINS, GUIDANCE: ICD-10-PCS | Performed by: INTERNAL MEDICINE

## 2018-01-24 PROCEDURE — 5A1935Z RESPIRATORY VENTILATION, LESS THAN 24 CONSECUTIVE HOURS: ICD-10-PCS

## 2018-01-24 PROCEDURE — 05HM33Z INSERTION OF INFUSION DEVICE INTO RIGHT INTERNAL JUGULAR VEIN, PERCUTANEOUS APPROACH: ICD-10-PCS | Performed by: INTERNAL MEDICINE

## 2018-01-24 RX ADMIN — PROPOFOL SCH MLS/HR: 10 INJECTION, EMULSION INTRAVENOUS at 11:47

## 2018-01-24 RX ADMIN — PROPOFOL SCH MLS/HR: 10 INJECTION, EMULSION INTRAVENOUS at 07:49

## 2018-01-24 RX ADMIN — DEXTROSE MONOHYDRATE SCH MLS/HR: 50 INJECTION, SOLUTION INTRAVENOUS at 06:17

## 2018-01-24 RX ADMIN — PROPOFOL SCH MLS/HR: 10 INJECTION, EMULSION INTRAVENOUS at 01:06

## 2018-01-24 RX ADMIN — SODIUM CHLORIDE SCH MLS/HR: 9 INJECTION, SOLUTION INTRAVENOUS at 04:00

## 2018-01-24 RX ADMIN — DEXTROSE MONOHYDRATE SCH MLS/HR: 50 INJECTION, SOLUTION INTRAVENOUS at 15:35

## 2018-01-24 RX ADMIN — SODIUM CHLORIDE SCH MLS/HR: 9 INJECTION, SOLUTION INTRAVENOUS at 11:47

## 2018-01-24 RX ADMIN — DEXTROSE MONOHYDRATE SCH MLS/HR: 50 INJECTION, SOLUTION INTRAVENOUS at 12:23

## 2018-01-24 RX ADMIN — PROPOFOL SCH MLS/HR: 10 INJECTION, EMULSION INTRAVENOUS at 05:00

## 2018-01-24 RX ADMIN — FENTANYL CITRATE SCH MLS/HR: 50 INJECTION INTRAMUSCULAR; INTRAVENOUS at 02:14

## 2018-01-24 RX ADMIN — PROPOFOL SCH MLS/HR: 10 INJECTION, EMULSION INTRAVENOUS at 17:19

## 2018-01-24 RX ADMIN — MUPIROCIN SCH APPLIC: 20 OINTMENT TOPICAL at 10:19

## 2018-01-24 RX ADMIN — FENTANYL CITRATE SCH MLS/HR: 50 INJECTION INTRAMUSCULAR; INTRAVENOUS at 08:24

## 2018-01-24 RX ADMIN — FENTANYL CITRATE SCH MLS/HR: 50 INJECTION INTRAMUSCULAR; INTRAVENOUS at 00:00

## 2018-01-24 RX ADMIN — FENTANYL CITRATE SCH MLS/HR: 50 INJECTION INTRAMUSCULAR; INTRAVENOUS at 15:20

## 2018-01-24 RX ADMIN — FENTANYL CITRATE SCH MLS/HR: 50 INJECTION INTRAMUSCULAR; INTRAVENOUS at 18:10

## 2018-01-24 RX ADMIN — FENTANYL CITRATE SCH MLS/HR: 50 INJECTION INTRAMUSCULAR; INTRAVENOUS at 06:20

## 2018-01-24 RX ADMIN — LEVOFLOXACIN SCH MLS/HR: 5 INJECTION, SOLUTION INTRAVENOUS at 17:19

## 2018-01-24 RX ADMIN — FENTANYL CITRATE SCH MLS/HR: 50 INJECTION INTRAMUSCULAR; INTRAVENOUS at 08:32

## 2018-01-24 RX ADMIN — OSELTAMIVIR PHOSPHATE SCH MG: 75 CAPSULE ORAL at 10:19

## 2018-01-24 NOTE — PN
Progress Note, Physician


Chief Complaint: 





ID








Had to be intubated overnight





Still getting Vancomycin and Levofloxacin





Some pressor on board 





- Current Medication List


Current Medications: 


Active Medications





Acetaminophen (Ofirmev Injection -)  1,000 mg IVPB Q6H PRN


   PRN Reason: FEVER


Chlorhexidine Gluconate (Hibiclens For Decolonization -)  1 applic TP HS [facility]


   Last Admin: 01/23/18 21:34 Dose:  1 applic


Heparin Sodium (Porcine) (Heparin -)  5,000 unit SQ BID [facility]


Vancomycin HCl 1,250 mg/ (Dextrose)  250 mls @ 166.667 mls/hr IVPB Q12H [facility]


   PRN Reason: Protocol


   Last Admin: 01/23/18 21:33 Dose:  166.667 mls/hr


Levofloxacin (Levaquin 750 Mg Premixed Ivpb -)  750 mg in 150 mls @ 150 mls/hr 

IVPB Q24H [facility]


   Last Admin: 01/23/18 17:36 Dose:  150 mls/hr


Propofol (Diprivan -)  1,000,000 mcg in 100 mls @ 7.973 mls/hr IVPB TITR [facility]; 

15 MCG/KG/MIN


   PRN Reason: Protocol


   Last Admin: 01/24/18 05:00 Dose:  50 mcg/kg/min, 26.578 mls/hr


Phenylephrine HCl 20,000 mcg/ (Sodium Chloride)  250 mls @ 75 mls/hr IVPB ASDIR 

[facility]; 100 MCG/MIN


   PRN Reason: Protocol


   Last Admin: 01/24/18 04:00 Dose:  125 mcg/min, 93.75 mls/hr


Fentanyl 500 mcg/ Dextrose  100 mls @ 40 mls/hr IVPB TITR [facility]; 200 MCG/HR


   PRN Reason: Protocol


   Stop: 01/25/18 01:14


   Last Admin: 01/24/18 06:20 Dose:  40 mls/hr


Vecuronium Bromide 50 mg/ (Dextrose)  250 mls @ 53.15 mls/hr IVPB TITR [facility]


   PRN Reason: 2 MCG/KG/MIN


   Last Admin: 01/24/18 06:17 Dose:  2 mcg/kg/min, 53.15 mls/hr


Mupirocin (Bactroban Ointment (For Decolonization) -)  1 applic NS BID [facility]


   Stop: 01/26/18 21:59


   Last Admin: 01/23/18 21:34 Dose:  1 applic


Oseltamivir Phosphate (Tamiflu -)  75 mg PO BID [facility]


   Stop: 01/26/18 21:59


   Last Admin: 01/23/18 22:05 Dose:  75 mg


Pantoprazole Sodium (Protonix Iv)  40 mg IVPUSH DAILY [facility]


Sodium Chloride (Ocean Spray Nasal Spray -)  2 spray NS TID PRN


   PRN Reason: NASAL CONGESTION


   Last Admin: 01/22/18 22:17 Dose:  2 sprays


Vecuronium Bromide (Vecuronium Bromide)  50 mg IVPUSH ONCE ONE


   Stop: 01/24/18 06:21











- Objective


Vital Signs: 


 Vital Signs











Temperature  99 F   01/24/18 06:00


 


Pulse Rate  107 H  01/24/18 06:00


 


Respiratory Rate  30 H  01/24/18 06:00


 


Blood Pressure  118/53   01/24/18 06:00


 


O2 Sat by Pulse Oximetry (%)  94 L  01/23/18 22:00











Constitutional: Yes: Other (INtubated)


Cardiovascular: Yes: Tachycardia, S1, S2


Respiratory: Yes: WNL, Regular, CTA Bilaterally, Intubated, Rales


Gastrointestinal: Yes: WNL, Normal Bowel Sounds, Soft.  No: Tenderness, 

Tenderness, Rebound


Edema: No


Labs: 


 CBC, BMP





 01/23/18 05:05 





 01/23/18 05:05 





 INR, PTT











INR  1.16  (0.82-1.09)  H  01/21/18  08:10    














Problem List





- Problems


(1) Viral syndrome


Code(s): B34.9 - VIRAL INFECTION, UNSPECIFIED   





(2) ARDS (adult respiratory distress syndrome)


Code(s): J80 - ACUTE RESPIRATORY DISTRESS SYNDROME   





(3) Acute respiratory failure


Code(s): J96.00 - ACUTE RESPIRATORY FAILURE, UNSP W HYPOXIA OR HYPERCAPNIA   





(4) Acute kidney injury


Code(s): N17.9 - ACUTE KIDNEY FAILURE, UNSPECIFIED   





(5) Rhabdomyolysis


Code(s): M62.82 - RHABDOMYOLYSIS   





Assessment/Plan





Microbiology





01/22/18 18:00   Sputum - Expectorated   Gram Stain - Final


01/22/18 12:00   Nasopharyngeal Swab   Respiratory Syncytial Virus Ag - Final


01/21/18 11:35   Urine For Antigen Detection   Legionella Antigen - Final


01/21/18 11:35   Urine For Antigen Detection   Streptococcus pneumoniae Antigen 

(M - Final


01/21/18 08:10   Blood - Peripheral Venous   Blood Culture - Preliminary


                              NO GROWTH OBTAINED AFTER 48 HOURS, INCUBATION TO 

CONTINUE


                              FOR 3 DAYS.


01/21/18 08:00   Blood - Peripheral Venous   Blood Culture - Preliminary


                              NO GROWTH OBTAINED AFTER 48 HOURS, INCUBATION TO 

CONTINUE


                              FOR 3 DAYS.





 Laboratory Tests











  01/21/18 01/23/18 01/23/18





  15:44 05:05 05:05


 


WBC   14.1 H 


 


Hgb   12.1 


 


Plt Count   207 


 


BUN    17  D


 


Creatinine    1.0  D


 


Total Bilirubin    1.1 H D


 


AST    316 H D


 


ALT    178 H D


 


Alkaline Phosphatase    182 H


 


HIV 1&2 Antibody Screen  Negative  


 


HIV P24 Antigen  Negative  








Assessment Working diagnosis is Viral pneumonia with ARDS now intubated  

Hepatitis possibly part of viral ilness resolving





Plan


Stop Vancomycin


Continue the Levofloxacin


CBC ESR


Sputum pending but no polys seen on gram stain





Caron WHYTE

## 2018-01-24 NOTE — PN
Progress Note, Physician


History of Present Illness: 





47y M hx of htn, hyperlipidemia,  asthma, obesity, substance abuse (cocaine 

years ago; tobacoo; alcohol--last drink one week ago; ?anaboic steroids) 

presents with complaint of sob. Per girlfriend, the pt has been having fever 

and cough for about a week with alot of congestion. GF also with similar 

symptoms last week. Pt endorses worsening sob, and gf finally convinced him to 

come to the ED today. One family member is being treated for influenza.





Pt denies any chest pain, leg swelling, hemoptysis, dizziness, abd pain, n/v.





pt arrived with sat of 60% on RA, in mild distress, was started on NRB with 

improvement to 80%, started on bipap with improvement to 94% (on 12/6, 75% O2).

  


Pts pulm exam noted for scant rales at the bsaes and deminshed breathsounds b/l


abd soft notnender





ddx includes pna, influenza, ?consier PE, ?underlying lung/heart dz?


sepsis orderset initiated


cxr


continue bipap





- Current Medication List


Current Medications: 


Active Medications





Acetaminophen (Ofirmev Injection -)  1,000 mg IVPB Q6H PRN


   PRN Reason: FEVER


   Last Admin: 01/24/18 09:42 Dose:  1,000 mg


Chlorhexidine Gluconate (Hibiclens For Decolonization -)  1 applic TP HS CINTIA


   Last Admin: 01/23/18 21:34 Dose:  1 applic


Heparin Sodium (Porcine) (Heparin -)  5,000 unit SQ BID CINTIA


Levofloxacin (Levaquin 750 Mg Premixed Ivpb -)  750 mg in 150 mls @ 150 mls/hr 

IVPB Q24H CINTIA


   Last Admin: 01/23/18 17:36 Dose:  150 mls/hr


Propofol (Diprivan -)  1,000,000 mcg in 100 mls @ 7.973 mls/hr IVPB TITR CINTIA; 

15 MCG/KG/MIN


   PRN Reason: Protocol


   Last Admin: 01/24/18 07:49 Dose:  50 mcg/kg/min, 26.578 mls/hr


Phenylephrine HCl 20,000 mcg/ (Sodium Chloride)  250 mls @ 75 mls/hr IVPB ASDIR 

CINTIA; 100 MCG/MIN


   PRN Reason: Protocol


   Last Titration: 01/24/18 07:47 Dose:  80 mcg/min, 60 mls/hr


Fentanyl 500 mcg/ Dextrose  100 mls @ 40 mls/hr IVPB TITR CINTIA; 200 MCG/HR


   PRN Reason: Protocol


   Stop: 01/25/18 01:14


   Last Admin: 01/24/18 08:32 Dose:  30 mls/hr


Vecuronium Bromide 50 mg/ (Dextrose)  250 mls @ 53.15 mls/hr IVPB TITR CINTIA


   PRN Reason: 2 MCG/KG/MIN


   Last Admin: 01/24/18 06:17 Dose:  2 mcg/kg/min, 53.15 mls/hr


Mupirocin (Bactroban Ointment (For Decolonization) -)  1 applic NS BID CINTIA


   Stop: 01/26/18 21:59


   Last Admin: 01/23/18 21:34 Dose:  1 applic


Oseltamivir Phosphate (Tamiflu -)  75 mg PO BID CINTIA


   Stop: 01/26/18 21:59


   Last Admin: 01/23/18 22:05 Dose:  75 mg


Pantoprazole Sodium (Protonix Iv)  40 mg IVPUSH DAILY [facility]


Sodium Chloride (Ocean Spray Nasal Spray -)  2 spray NS TID PRN


   PRN Reason: NASAL CONGESTION


   Last Admin: 01/22/18 22:17 Dose:  2 sprays











- Objective


Vital Signs: 


 Vital Signs











Temperature  99 F   01/24/18 06:00


 


Pulse Rate  122 H  01/24/18 07:47


 


Respiratory Rate  30 H  01/24/18 07:51


 


Blood Pressure  139/50   01/24/18 07:47


 


O2 Sat by Pulse Oximetry (%)  98   01/24/18 07:51











Eyes: Yes: WNL, Conjunctiva Clear, EOM Intact


HENT: Yes: WNL, Atraumatic, Normocephalic


Neck: Yes: WNL, Supple, Trachea Midline


Cardiovascular: Yes: Regular Rate and Rhythm, Tachycardia, S1, S2


Respiratory: Yes: Diminished, Intubated, Mechanically Ventilated


Gastrointestinal: Yes: WNL, Normal Bowel Sounds


Genitourinary: Yes: WNL


Musculoskeletal: Yes: WNL


Extremities: Yes: WNL


Edema: No


Integumentary: Yes: WNL


...Motor Strength: WNL


Psychiatric: Yes: WNL


Labs: 


 CBC, BMP





 01/24/18 06:35 





 01/24/18 06:35 





 INR, PTT











INR  1.16  (0.82-1.09)  H  01/21/18  08:10    








 Laboratory Tests











  01/21/18 01/21/18 01/21/18





  08:10 08:10 08:10


 


WBC  11.4 H  


 


RBC  4.48  


 


Hgb  13.8  


 


Hct  40.9  


 


MCV  91.3  


 


MCH  30.7  


 


MCHC  33.6  


 


RDW  13.2  


 


Plt Count  258  


 


MPV  8.2  


 


Neutrophils %  84.3 H  


 


Lymphocytes %  7.3 L  


 


Monocytes %  7.8  


 


Eosinophils %  0.0  


 


Basophils %  0.6  


 


PT with INR   13.10 H 


 


INR   1.16 H 


 


PTT (Actin FS)   31.2 


 


Puncture Site   


 


ABG pH   


 


ABG pCO2 at Pt Temp   


 


ABG pO2 at Pt Temp   


 


ABG HCO3   


 


ABG O2 Sat (Measured)   


 


ABG O2 Content   


 


ABG Base Excess   


 


Long Test   


 


VBG pH    7.44 H


 


POC VBG pCO2    30.8 L


 


POC VBG pO2    35.3


 


Mixed VBG HCO3    20.7


 


Carboxyhemoglobin   


 


Methemoglobin   


 


O2 Delivery Device   


 


Oxygen Flow Rate   


 


Vent Mode   


 


Vent Rate   


 


Mechanical Rate   


 


PEEP   


 


Pressure Support Vent   


 


Sodium   


 


Potassium   


 


Chloride   


 


Carbon Dioxide   


 


Anion Gap   


 


BUN   


 


Creatinine   


 


Creat Clearance w eGFR   


 


Random Glucose   


 


Lactic Acid   


 


Calcium   


 


Phosphorus   


 


Magnesium   


 


Total Bilirubin   


 


AST   


 


ALT   


 


Alkaline Phosphatase   


 


Ammonia   


 


Creatine Kinase   


 


Creatine Kinase Index   


 


CK-MB (CK-2)   


 


Troponin I   


 


C-Reactive Protein   


 


B-Natriuretic Peptide   


 


Total Protein   


 


Albumin   


 


Triglycerides   


 


Cholesterol   


 


Total LDL Cholesterol   


 


HDL Cholesterol   


 


TSH   


 


Urine Color   


 


Urine Appearance   


 


Urine pH   


 


Ur Specific Gravity   


 


Urine Protein   


 


Urine Glucose (UA)   


 


Urine Ketones   


 


Urine Blood   


 


Urine Nitrite   


 


Urine Bilirubin   


 


Urine Urobilinogen   


 


Ur Leukocyte Esterase   


 


Urine WBC (Auto)   


 


Urine RBC (Auto)   


 


Hyaline Casts   


 


Urine Mucus   


 


Random Vancomycin   


 


Opiates Screen   


 


Methadone Screen   


 


Barbiturate Screen   


 


Phencyclidine Screen   


 


Ur Amphetamines Screen   


 


MDMA (Ecstasy) Screen   


 


Benzodiazepines Screen   


 


Cocaine Screen   


 


U Marijuana (THC) Screen   


 


Alcohol, Quantitative   


 


Hepatitis A IgM Ab   


 


Hep Bs Antigen   


 


Hep B Core IgM Ab   


 


Hepatitis C Antibody   


 


HIV 1&2 Antibody Screen   


 


HIV P24 Antigen   


 


Blood Type   


 


Antibody Screen   














  01/21/18 01/21/18 01/21/18





  08:10 08:10 08:20


 


WBC   


 


RBC   


 


Hgb   


 


Hct   


 


MCV   


 


MCH   


 


MCHC   


 


RDW   


 


Plt Count   


 


MPV   


 


Neutrophils %   


 


Lymphocytes %   


 


Monocytes %   


 


Eosinophils %   


 


Basophils %   


 


PT with INR   


 


INR   


 


PTT (Actin FS)   


 


Puncture Site   


 


ABG pH   


 


ABG pCO2 at Pt Temp   


 


ABG pO2 at Pt Temp   


 


ABG HCO3   


 


ABG O2 Sat (Measured)   


 


ABG O2 Content   


 


ABG Base Excess   


 


Long Test   


 


VBG pH   


 


POC VBG pCO2   


 


POC VBG pO2   


 


Mixed VBG HCO3   


 


Carboxyhemoglobin   


 


Methemoglobin   


 


O2 Delivery Device   


 


Oxygen Flow Rate   


 


Vent Mode   


 


Vent Rate   


 


Mechanical Rate   


 


PEEP   


 


Pressure Support Vent   


 


Sodium   


 


Potassium   


 


Chloride   


 


Carbon Dioxide   


 


Anion Gap   


 


BUN   


 


Creatinine   


 


Creat Clearance w eGFR   


 


Random Glucose   


 


Lactic Acid   2.1 H 


 


Calcium   


 


Phosphorus   


 


Magnesium   


 


Total Bilirubin   


 


AST   


 


ALT   


 


Alkaline Phosphatase   


 


Ammonia  75.5 H  


 


Creatine Kinase   


 


Creatine Kinase Index   


 


CK-MB (CK-2)   


 


Troponin I   


 


C-Reactive Protein   


 


B-Natriuretic Peptide   


 


Total Protein   


 


Albumin   


 


Triglycerides   


 


Cholesterol   


 


Total LDL Cholesterol   


 


HDL Cholesterol   


 


TSH   


 


Urine Color   


 


Urine Appearance   


 


Urine pH   


 


Ur Specific Gravity   


 


Urine Protein   


 


Urine Glucose (UA)   


 


Urine Ketones   


 


Urine Blood   


 


Urine Nitrite   


 


Urine Bilirubin   


 


Urine Urobilinogen   


 


Ur Leukocyte Esterase   


 


Urine WBC (Auto)   


 


Urine RBC (Auto)   


 


Hyaline Casts   


 


Urine Mucus   


 


Random Vancomycin   


 


Opiates Screen   


 


Methadone Screen   


 


Barbiturate Screen   


 


Phencyclidine Screen   


 


Ur Amphetamines Screen   


 


MDMA (Ecstasy) Screen   


 


Benzodiazepines Screen   


 


Cocaine Screen   


 


U Marijuana (THC) Screen   


 


Alcohol, Quantitative   


 


Hepatitis A IgM Ab   


 


Hep Bs Antigen   


 


Hep B Core IgM Ab   


 


Hepatitis C Antibody   


 


HIV 1&2 Antibody Screen   


 


HIV P24 Antigen   


 


Blood Type    A POSITIVE


 


Antibody Screen    Negative














  01/21/18 01/21/18 01/21/18





  09:00 09:30 13:15


 


WBC   


 


RBC   


 


Hgb   


 


Hct   


 


MCV   


 


MCH   


 


MCHC   


 


RDW   


 


Plt Count   


 


MPV   


 


Neutrophils %   


 


Lymphocytes %   


 


Monocytes %   


 


Eosinophils %   


 


Basophils %   


 


PT with INR   


 


INR   


 


PTT (Actin FS)   


 


Puncture Site  Left radial  


 


ABG pH  7.49 H  


 


ABG pCO2 at Pt Temp  24.3 L  


 


ABG pO2 at Pt Temp  54.8 L  


 


ABG HCO3  18.5 L  


 


ABG O2 Sat (Measured)  88.1 L  


 


ABG O2 Content  17.8  


 


ABG Base Excess  -2.7 L  


 


Long Test  Positive  


 


VBG pH   


 


POC VBG pCO2   


 


POC VBG pO2   


 


Mixed VBG HCO3   


 


Carboxyhemoglobin  1.6  


 


Methemoglobin  0.9  


 


O2 Delivery Device  Bipap  


 


Oxygen Flow Rate  75%  


 


Vent Mode  S/t  


 


Vent Rate  10  


 


Mechanical Rate  Bipap  


 


PEEP   


 


Pressure Support Vent  12/6  


 


Sodium   127 L 


 


Potassium   3.6 


 


Chloride   94 L 


 


Carbon Dioxide   20 L 


 


Anion Gap   13 


 


BUN   42 H 


 


Creatinine   2.1 H 


 


Creat Clearance w eGFR   34.02 


 


Random Glucose   136 H 


 


Lactic Acid    1.7


 


Calcium   7.2 L 


 


Phosphorus   


 


Magnesium   


 


Total Bilirubin   1.0 


 


AST   869 H 


 


ALT   326 H 


 


Alkaline Phosphatase   180 H 


 


Ammonia   


 


Creatine Kinase   6546 H 


 


Creatine Kinase Index   0.0 


 


CK-MB (CK-2)   < 1.0 


 


Troponin I   0.06 H 


 


C-Reactive Protein   


 


B-Natriuretic Peptide   107.08 


 


Total Protein   7.1 


 


Albumin   2.4 L 


 


Triglycerides   


 


Cholesterol   


 


Total LDL Cholesterol   


 


HDL Cholesterol   


 


TSH   


 


Urine Color   


 


Urine Appearance   


 


Urine pH   


 


Ur Specific Gravity   


 


Urine Protein   


 


Urine Glucose (UA)   


 


Urine Ketones   


 


Urine Blood   


 


Urine Nitrite   


 


Urine Bilirubin   


 


Urine Urobilinogen   


 


Ur Leukocyte Esterase   


 


Urine WBC (Auto)   


 


Urine RBC (Auto)   


 


Hyaline Casts   


 


Urine Mucus   


 


Random Vancomycin   


 


Opiates Screen   


 


Methadone Screen   


 


Barbiturate Screen   


 


Phencyclidine Screen   


 


Ur Amphetamines Screen   


 


MDMA (Ecstasy) Screen   


 


Benzodiazepines Screen   


 


Cocaine Screen   


 


U Marijuana (THC) Screen   


 


Alcohol, Quantitative   


 


Hepatitis A IgM Ab   


 


Hep Bs Antigen   


 


Hep B Core IgM Ab   


 


Hepatitis C Antibody   


 


HIV 1&2 Antibody Screen   


 


HIV P24 Antigen   


 


Blood Type   


 


Antibody Screen   














  01/21/18 01/21/18 01/21/18





  13:20 13:20 14:25


 


WBC   


 


RBC   


 


Hgb   


 


Hct   


 


MCV   


 


MCH   


 


MCHC   


 


RDW   


 


Plt Count   


 


MPV   


 


Neutrophils %   


 


Lymphocytes %   


 


Monocytes %   


 


Eosinophils %   


 


Basophils %   


 


PT with INR   


 


INR   


 


PTT (Actin FS)   


 


Puncture Site   


 


ABG pH   


 


ABG pCO2 at Pt Temp   


 


ABG pO2 at Pt Temp   


 


ABG HCO3   


 


ABG O2 Sat (Measured)   


 


ABG O2 Content   


 


ABG Base Excess   


 


Long Test   


 


VBG pH   


 


POC VBG pCO2   


 


POC VBG pO2   


 


Mixed VBG HCO3   


 


Carboxyhemoglobin   


 


Methemoglobin   


 


O2 Delivery Device   


 


Oxygen Flow Rate   


 


Vent Mode   


 


Vent Rate   


 


Mechanical Rate   


 


PEEP   


 


Pressure Support Vent   


 


Sodium   


 


Potassium   


 


Chloride   


 


Carbon Dioxide   


 


Anion Gap   


 


BUN   


 


Creatinine   


 


Creat Clearance w eGFR   


 


Random Glucose   


 


Lactic Acid   


 


Calcium   


 


Phosphorus   


 


Magnesium   


 


Total Bilirubin   


 


AST   


 


ALT   


 


Alkaline Phosphatase   


 


Ammonia   


 


Creatine Kinase   


 


Creatine Kinase Index   


 


CK-MB (CK-2)   


 


Troponin I   


 


C-Reactive Protein   


 


B-Natriuretic Peptide   


 


Total Protein   


 


Albumin   


 


Triglycerides   


 


Cholesterol   


 


Total LDL Cholesterol   


 


HDL Cholesterol   


 


TSH   


 


Urine Color  Yellow  


 


Urine Appearance  Slcloudy  


 


Urine pH  5.0  


 


Ur Specific Gravity  1.010  


 


Urine Protein  2+ H  


 


Urine Glucose (UA)  Negative  


 


Urine Ketones  Negative  


 


Urine Blood  3+ H  


 


Urine Nitrite  Negative  


 


Urine Bilirubin  Negative  


 


Urine Urobilinogen  Negative  


 


Ur Leukocyte Esterase  Negative  


 


Urine WBC (Auto)  None  


 


Urine RBC (Auto)  1  


 


Hyaline Casts  1  


 


Urine Mucus  Rare  


 


Random Vancomycin   


 


Opiates Screen   Negative 


 


Methadone Screen   Negative 


 


Barbiturate Screen   Negative 


 


Phencyclidine Screen   Negative 


 


Ur Amphetamines Screen   Negative 


 


MDMA (Ecstasy) Screen   Negative 


 


Benzodiazepines Screen   Negative 


 


Cocaine Screen   Negative 


 


U Marijuana (THC) Screen   Negative 


 


Alcohol, Quantitative    < 5.0


 


Hepatitis A IgM Ab   


 


Hep Bs Antigen   


 


Hep B Core IgM Ab   


 


Hepatitis C Antibody   


 


HIV 1&2 Antibody Screen   


 


HIV P24 Antigen   


 


Blood Type   


 


Antibody Screen   














  01/21/18 01/21/18 01/21/18





  15:44 15:44 15:44


 


WBC   


 


RBC   


 


Hgb   


 


Hct   


 


MCV   


 


MCH   


 


MCHC   


 


RDW   


 


Plt Count   


 


MPV   


 


Neutrophils %   


 


Lymphocytes %   


 


Monocytes %   


 


Eosinophils %   


 


Basophils %   


 


PT with INR   


 


INR   


 


PTT (Actin FS)   


 


Puncture Site   


 


ABG pH   


 


ABG pCO2 at Pt Temp   


 


ABG pO2 at Pt Temp   


 


ABG HCO3   


 


ABG O2 Sat (Measured)   


 


ABG O2 Content   


 


ABG Base Excess   


 


Long Test   


 


VBG pH   


 


POC VBG pCO2   


 


POC VBG pO2   


 


Mixed VBG HCO3   


 


Carboxyhemoglobin   


 


Methemoglobin   


 


O2 Delivery Device   


 


Oxygen Flow Rate   


 


Vent Mode   


 


Vent Rate   


 


Mechanical Rate   


 


PEEP   


 


Pressure Support Vent   


 


Sodium   134 L 


 


Potassium   3.7 


 


Chloride   101 


 


Carbon Dioxide   23 


 


Anion Gap   10 


 


BUN   40 H 


 


Creatinine   1.9 H 


 


Creat Clearance w eGFR   38.19 


 


Random Glucose   107 H D 


 


Lactic Acid   


 


Calcium   6.9 L* 


 


Phosphorus   


 


Magnesium   


 


Total Bilirubin   0.8 


 


AST   625 H D 


 


ALT   249 H D 


 


Alkaline Phosphatase   153 H 


 


Ammonia   


 


Creatine Kinase   5509 H 


 


Creatine Kinase Index   0.1 


 


CK-MB (CK-2)   2.318 


 


Troponin I   


 


C-Reactive Protein   


 


B-Natriuretic Peptide   


 


Total Protein   6.0 L 


 


Albumin   2.0 L 


 


Triglycerides   


 


Cholesterol   


 


Total LDL Cholesterol   


 


HDL Cholesterol   


 


TSH   


 


Urine Color   


 


Urine Appearance   


 


Urine pH   


 


Ur Specific Gravity   


 


Urine Protein   


 


Urine Glucose (UA)   


 


Urine Ketones   


 


Urine Blood   


 


Urine Nitrite   


 


Urine Bilirubin   


 


Urine Urobilinogen   


 


Ur Leukocyte Esterase   


 


Urine WBC (Auto)   


 


Urine RBC (Auto)   


 


Hyaline Casts   


 


Urine Mucus   


 


Random Vancomycin    12.531


 


Opiates Screen   


 


Methadone Screen   


 


Barbiturate Screen   


 


Phencyclidine Screen   


 


Ur Amphetamines Screen   


 


MDMA (Ecstasy) Screen   


 


Benzodiazepines Screen   


 


Cocaine Screen   


 


U Marijuana (THC) Screen   


 


Alcohol, Quantitative   


 


Hepatitis A IgM Ab   


 


Hep Bs Antigen   


 


Hep B Core IgM Ab   


 


Hepatitis C Antibody   


 


HIV 1&2 Antibody Screen  Negative  


 


HIV P24 Antigen  Negative  


 


Blood Type   


 


Antibody Screen   














  01/21/18 01/21/18 01/22/18





  15:44 15:44 05:22


 


WBC   11.0 H  13.3 H


 


RBC   3.99 L  4.22


 


Hgb   12.4  D  13.2


 


Hct   37.1  39.2


 


MCV   92.9  92.9


 


MCH   31.2  31.2


 


MCHC   33.6  33.6


 


RDW   13.4  13.2


 


Plt Count   220  251


 


MPV   8.4  8.5


 


Neutrophils %   81.3 


 


Lymphocytes %   10.7  D 


 


Monocytes %   7.7 


 


Eosinophils %   0.0 


 


Basophils %   0.3 


 


PT with INR   


 


INR   


 


PTT (Actin FS)   


 


Puncture Site   


 


ABG pH   


 


ABG pCO2 at Pt Temp   


 


ABG pO2 at Pt Temp   


 


ABG HCO3   


 


ABG O2 Sat (Measured)   


 


ABG O2 Content   


 


ABG Base Excess   


 


Long Test   


 


VBG pH   


 


POC VBG pCO2   


 


POC VBG pO2   


 


Mixed VBG HCO3   


 


Carboxyhemoglobin   


 


Methemoglobin   


 


O2 Delivery Device   


 


Oxygen Flow Rate   


 


Vent Mode   


 


Vent Rate   


 


Mechanical Rate   


 


PEEP   


 


Pressure Support Vent   


 


Sodium   


 


Potassium   


 


Chloride   


 


Carbon Dioxide   


 


Anion Gap   


 


BUN   


 


Creatinine   


 


Creat Clearance w eGFR   


 


Random Glucose   


 


Lactic Acid   


 


Calcium   


 


Phosphorus   


 


Magnesium   


 


Total Bilirubin   


 


AST   


 


ALT   


 


Alkaline Phosphatase   


 


Ammonia   


 


Creatine Kinase   


 


Creatine Kinase Index   


 


CK-MB (CK-2)   


 


Troponin I   


 


C-Reactive Protein   


 


B-Natriuretic Peptide   


 


Total Protein   


 


Albumin   


 


Triglycerides   


 


Cholesterol   


 


Total LDL Cholesterol   


 


HDL Cholesterol   


 


TSH   


 


Urine Color   


 


Urine Appearance   


 


Urine pH   


 


Ur Specific Gravity   


 


Urine Protein   


 


Urine Glucose (UA)   


 


Urine Ketones   


 


Urine Blood   


 


Urine Nitrite   


 


Urine Bilirubin   


 


Urine Urobilinogen   


 


Ur Leukocyte Esterase   


 


Urine WBC (Auto)   


 


Urine RBC (Auto)   


 


Hyaline Casts   


 


Urine Mucus   


 


Random Vancomycin   


 


Opiates Screen   


 


Methadone Screen   


 


Barbiturate Screen   


 


Phencyclidine Screen   


 


Ur Amphetamines Screen   


 


MDMA (Ecstasy) Screen   


 


Benzodiazepines Screen   


 


Cocaine Screen   


 


U Marijuana (THC) Screen   


 


Alcohol, Quantitative   


 


Hepatitis A IgM Ab  Negative  


 


Hep Bs Antigen  Negative  


 


Hep B Core IgM Ab  Negative  


 


Hepatitis C Antibody  <0.1  


 


HIV 1&2 Antibody Screen   


 


HIV P24 Antigen   


 


Blood Type   


 


Antibody Screen   














  01/22/18 01/22/18 01/23/18





  05:22 05:22 05:05


 


WBC    14.1 H


 


RBC    3.87 L


 


Hgb    12.1


 


Hct    35.6


 


MCV    92.0


 


MCH    31.2


 


MCHC    33.9


 


RDW    13.4


 


Plt Count    207


 


MPV    8.5


 


Neutrophils %    91.4 H


 


Lymphocytes %    5.1 L D


 


Monocytes %    3.3 L


 


Eosinophils %    0.1  D


 


Basophils %    0.1


 


PT with INR   


 


INR   


 


PTT (Actin FS)   


 


Puncture Site   


 


ABG pH   


 


ABG pCO2 at Pt Temp   


 


ABG pO2 at Pt Temp   


 


ABG HCO3   


 


ABG O2 Sat (Measured)   


 


ABG O2 Content   


 


ABG Base Excess   


 


Long Test   


 


VBG pH   


 


POC VBG pCO2   


 


POC VBG pO2   


 


Mixed VBG HCO3   


 


Carboxyhemoglobin   


 


Methemoglobin   


 


O2 Delivery Device   


 


Oxygen Flow Rate   


 


Vent Mode   


 


Vent Rate   


 


Mechanical Rate   


 


PEEP   


 


Pressure Support Vent   


 


Sodium  137  


 


Potassium  3.7  


 


Chloride  105  


 


Carbon Dioxide  21  


 


Anion Gap  11  


 


BUN  29 H D  


 


Creatinine  1.3  D  


 


Creat Clearance w eGFR  59.17  


 


Random Glucose  96  


 


Lactic Acid   


 


Calcium  7.3 L  


 


Phosphorus  2.8  


 


Magnesium  2.8 H  


 


Total Bilirubin  0.8  


 


AST  500 H  


 


ALT  227 H  


 


Alkaline Phosphatase  163 H  


 


Ammonia   


 


Creatine Kinase   


 


Creatine Kinase Index   


 


CK-MB (CK-2)   


 


Troponin I   


 


C-Reactive Protein   


 


B-Natriuretic Peptide   


 


Total Protein  6.7  


 


Albumin  2.1 L  


 


Triglycerides   


 


Cholesterol   


 


Total LDL Cholesterol   


 


HDL Cholesterol   


 


TSH  0.36  Cancelled 


 


Urine Color   


 


Urine Appearance   


 


Urine pH   


 


Ur Specific Gravity   


 


Urine Protein   


 


Urine Glucose (UA)   


 


Urine Ketones   


 


Urine Blood   


 


Urine Nitrite   


 


Urine Bilirubin   


 


Urine Urobilinogen   


 


Ur Leukocyte Esterase   


 


Urine WBC (Auto)   


 


Urine RBC (Auto)   


 


Hyaline Casts   


 


Urine Mucus   


 


Random Vancomycin   


 


Opiates Screen   


 


Methadone Screen   


 


Barbiturate Screen   


 


Phencyclidine Screen   


 


Ur Amphetamines Screen   


 


MDMA (Ecstasy) Screen   


 


Benzodiazepines Screen   


 


Cocaine Screen   


 


U Marijuana (THC) Screen   


 


Alcohol, Quantitative   


 


Hepatitis A IgM Ab   


 


Hep Bs Antigen   


 


Hep B Core IgM Ab   


 


Hepatitis C Antibody   


 


HIV 1&2 Antibody Screen   


 


HIV P24 Antigen   


 


Blood Type   


 


Antibody Screen   














  01/23/18 01/23/18 01/23/18





  05:05 05:05 15:20


 


WBC   


 


RBC   


 


Hgb   


 


Hct   


 


MCV   


 


MCH   


 


MCHC   


 


RDW   


 


Plt Count   


 


MPV   


 


Neutrophils %   


 


Lymphocytes %   


 


Monocytes %   


 


Eosinophils %   


 


Basophils %   


 


PT with INR   


 


INR   


 


PTT (Actin FS)   


 


Puncture Site   


 


ABG pH   


 


ABG pCO2 at Pt Temp   


 


ABG pO2 at Pt Temp   


 


ABG HCO3   


 


ABG O2 Sat (Measured)   


 


ABG O2 Content   


 


ABG Base Excess   


 


Long Test   


 


VBG pH   


 


POC VBG pCO2   


 


POC VBG pO2   


 


Mixed VBG HCO3   


 


Carboxyhemoglobin   


 


Methemoglobin   


 


O2 Delivery Device   


 


Oxygen Flow Rate   


 


Vent Mode   


 


Vent Rate   


 


Mechanical Rate   


 


PEEP   


 


Pressure Support Vent   


 


Sodium  137  


 


Potassium  3.4 L  


 


Chloride  104  


 


Carbon Dioxide  24  


 


Anion Gap  9  


 


BUN  17  D  


 


Creatinine  1.0  D  


 


Creat Clearance w eGFR  > 60  


 


Random Glucose  105  


 


Lactic Acid   


 


Calcium  7.4 L  


 


Phosphorus  2.6  


 


Magnesium  2.3  


 


Total Bilirubin  1.1 H D  


 


AST  316 H D  


 


ALT  178 H D  


 


Alkaline Phosphatase  182 H  


 


Ammonia   82.71 H 


 


Creatine Kinase   


 


Creatine Kinase Index   


 


CK-MB (CK-2)   


 


Troponin I   


 


C-Reactive Protein   


 


B-Natriuretic Peptide   


 


Total Protein  6.6  


 


Albumin  1.8 L  


 


Triglycerides    205 H


 


Cholesterol    102


 


Total LDL Cholesterol    69


 


HDL Cholesterol    13 L


 


TSH   


 


Urine Color   


 


Urine Appearance   


 


Urine pH   


 


Ur Specific Gravity   


 


Urine Protein   


 


Urine Glucose (UA)   


 


Urine Ketones   


 


Urine Blood   


 


Urine Nitrite   


 


Urine Bilirubin   


 


Urine Urobilinogen   


 


Ur Leukocyte Esterase   


 


Urine WBC (Auto)   


 


Urine RBC (Auto)   


 


Hyaline Casts   


 


Urine Mucus   


 


Random Vancomycin   


 


Opiates Screen   


 


Methadone Screen   


 


Barbiturate Screen   


 


Phencyclidine Screen   


 


Ur Amphetamines Screen   


 


MDMA (Ecstasy) Screen   


 


Benzodiazepines Screen   


 


Cocaine Screen   


 


U Marijuana (THC) Screen   


 


Alcohol, Quantitative   


 


Hepatitis A IgM Ab   


 


Hep Bs Antigen   


 


Hep B Core IgM Ab   


 


Hepatitis C Antibody   


 


HIV 1&2 Antibody Screen   


 


HIV P24 Antigen   


 


Blood Type   


 


Antibody Screen   














  01/23/18 01/24/18 01/24/18





  15:20 01:05 06:15


 


WBC   


 


RBC   


 


Hgb   


 


Hct   


 


MCV   


 


MCH   


 


MCHC   


 


RDW   


 


Plt Count   


 


MPV   


 


Neutrophils %   


 


Lymphocytes %   


 


Monocytes %   


 


Eosinophils %   


 


Basophils %   


 


PT with INR   


 


INR   


 


PTT (Actin FS)   


 


Puncture Site   Right radial  Left radial


 


ABG pH   7.26 L D  7.24 L*


 


ABG pCO2 at Pt Temp   51.7 H D  44.5


 


ABG pO2 at Pt Temp   112.0 H D  89.0  D


 


ABG HCO3   22.3  18.5 L


 


ABG O2 Sat (Measured)   96.7  94.4


 


ABG O2 Content   17.0  16.3


 


ABG Base Excess   -4.6 L  -8.2 L


 


Long Test   Positive  Positive


 


VBG pH   


 


POC VBG pCO2   


 


POC VBG pO2   


 


Mixed VBG HCO3   


 


Carboxyhemoglobin   


 


Methemoglobin   


 


O2 Delivery Device   Mech  Mec vent


 


Oxygen Flow Rate   100%  100%


 


Vent Mode   A/c  A/c


 


Vent Rate   22  30


 


Mechanical Rate   


 


PEEP   15.0  15.0


 


Pressure Support Vent   400  400


 


Sodium   


 


Potassium   


 


Chloride   


 


Carbon Dioxide   


 


Anion Gap   


 


BUN   


 


Creatinine   


 


Creat Clearance w eGFR   


 


Random Glucose   


 


Lactic Acid   


 


Calcium   


 


Phosphorus   


 


Magnesium   


 


Total Bilirubin   


 


AST   


 


ALT   


 


Alkaline Phosphatase   


 


Ammonia   


 


Creatine Kinase  1251 H  


 


Creatine Kinase Index  0.1  


 


CK-MB (CK-2)  1.358  


 


Troponin I  0.29 H D  


 


C-Reactive Protein   


 


B-Natriuretic Peptide   


 


Total Protein   


 


Albumin   


 


Triglycerides   


 


Cholesterol   


 


Total LDL Cholesterol   


 


HDL Cholesterol   


 


TSH   


 


Urine Color   


 


Urine Appearance   


 


Urine pH   


 


Ur Specific Gravity   


 


Urine Protein   


 


Urine Glucose (UA)   


 


Urine Ketones   


 


Urine Blood   


 


Urine Nitrite   


 


Urine Bilirubin   


 


Urine Urobilinogen   


 


Ur Leukocyte Esterase   


 


Urine WBC (Auto)   


 


Urine RBC (Auto)   


 


Hyaline Casts   


 


Urine Mucus   


 


Random Vancomycin   


 


Opiates Screen   


 


Methadone Screen   


 


Barbiturate Screen   


 


Phencyclidine Screen   


 


Ur Amphetamines Screen   


 


MDMA (Ecstasy) Screen   


 


Benzodiazepines Screen   


 


Cocaine Screen   


 


U Marijuana (THC) Screen   


 


Alcohol, Quantitative   


 


Hepatitis A IgM Ab   


 


Hep Bs Antigen   


 


Hep B Core IgM Ab   


 


Hepatitis C Antibody   


 


HIV 1&2 Antibody Screen   


 


HIV P24 Antigen   


 


Blood Type   


 


Antibody Screen   














  01/24/18 01/24/18 01/24/18





  06:35 06:35 06:35


 


WBC  29.6 H D  


 


RBC  4.09  


 


Hgb  12.6  


 


Hct  39.1  


 


MCV  95.5  


 


MCH  30.9  


 


MCHC  32.3  


 


RDW  13.9  


 


Plt Count  241  


 


MPV  8.7  


 


Neutrophils %  No Result Required.  


 


Lymphocytes %  No Result Required.  


 


Monocytes %   


 


Eosinophils %   


 


Basophils %   


 


PT with INR   


 


INR   


 


PTT (Actin FS)   


 


Puncture Site   


 


ABG pH   


 


ABG pCO2 at Pt Temp   


 


ABG pO2 at Pt Temp   


 


ABG HCO3   


 


ABG O2 Sat (Measured)   


 


ABG O2 Content   


 


ABG Base Excess   


 


Long Test   


 


VBG pH   


 


POC VBG pCO2   


 


POC VBG pO2   


 


Mixed VBG HCO3   


 


Carboxyhemoglobin   


 


Methemoglobin   


 


O2 Delivery Device   


 


Oxygen Flow Rate   


 


Vent Mode   


 


Vent Rate   


 


Mechanical Rate   


 


PEEP   


 


Pressure Support Vent   


 


Sodium   138 


 


Potassium   4.3  D 


 


Chloride   102 


 


Carbon Dioxide   21 


 


Anion Gap   15 


 


BUN   24 H D 


 


Creatinine   2.4 H D 


 


Creat Clearance w eGFR   29.16 


 


Random Glucose   136 H D 


 


Lactic Acid   


 


Calcium   7.9 L 


 


Phosphorus   6.8 H D 


 


Magnesium   2.8 H D 


 


Total Bilirubin   1.2 H 


 


AST   163 H D 


 


ALT   139 H D 


 


Alkaline Phosphatase   193 H 


 


Ammonia   


 


Creatine Kinase   


 


Creatine Kinase Index   


 


CK-MB (CK-2)   


 


Troponin I   


 


C-Reactive Protein    Cancelled


 


B-Natriuretic Peptide   


 


Total Protein   7.5 


 


Albumin   1.9 L 


 


Triglycerides   


 


Cholesterol   


 


Total LDL Cholesterol   


 


HDL Cholesterol   


 


TSH   


 


Urine Color   


 


Urine Appearance   


 


Urine pH   


 


Ur Specific Gravity   


 


Urine Protein   


 


Urine Glucose (UA)   


 


Urine Ketones   


 


Urine Blood   


 


Urine Nitrite   


 


Urine Bilirubin   


 


Urine Urobilinogen   


 


Ur Leukocyte Esterase   


 


Urine WBC (Auto)   


 


Urine RBC (Auto)   


 


Hyaline Casts   


 


Urine Mucus   


 


Random Vancomycin   


 


Opiates Screen   


 


Methadone Screen   


 


Barbiturate Screen   


 


Phencyclidine Screen   


 


Ur Amphetamines Screen   


 


MDMA (Ecstasy) Screen   


 


Benzodiazepines Screen   


 


Cocaine Screen   


 


U Marijuana (THC) Screen   


 


Alcohol, Quantitative   


 


Hepatitis A IgM Ab   


 


Hep Bs Antigen   


 


Hep B Core IgM Ab   


 


Hepatitis C Antibody   


 


HIV 1&2 Antibody Screen   


 


HIV P24 Antigen   


 


Blood Type   


 


Antibody Screen   














Assessment/Plan








- Problems


(1) Substance abuse


Assessment/Plan: 


as per pt's PMD (Dr. Krause), pt may have, in years past, used cocaine. He 

drinks heavily at times, is a daily cigarettes smoker. He is a personal bret 

at a gym, and may have used steroids.


He has been noncompliant to doctors' visits and medications.


Code(s): F19.10 - OTHER PSYCHOACTIVE SUBSTANCE ABUSE, UNCOMPLICATED   





(2) ARDS (adult respiratory distress syndrome)


Assessment/Plan: 


On Bipap, with marked and immediate desaturation when mask is even temporarily 

removed.


On Tamiflu for influenza; on Vancomycin and Levoquin.


WBC remain elevated.


ECHO: normal LVEF; normal chamber sizes; no valvular abnormalities.


Code(s): J80 - ACUTE RESPIRATORY DISTRESS SYNDROME   





(3) Hyponatremia


Assessment/Plan: 


now normal level.


Code(s): E87.1 - HYPO-OSMOLALITY AND HYPONATREMIA   





(4) Rhabdomyolysis


Assessment/Plan: 


decreasing CK; low CK/MB relative index.


BUN/Cr have also improved.


Code(s): M62.82 - RHABDOMYOLYSIS   





(5) Obesity


Code(s): E66.9 - OBESITY, UNSPECIFIED   





(6) Congestive cardiac failure


Assessment/Plan: 


Enlarged heart with ?LV dilation on CT chest.


; 


ECHO: normal LVEF; normal chamber sizes; no valvular abnormalities.


TNI 0.06-->0.29.


Total cholesterol 102 mg/dL; triglycerides 205.


TSH: 0.36.


Extensive coronary artery calcifications on CT chest, with multiple cardiac 

risks, including his father dying at 47 yrs old of "heart disease"; will 

require coronary artery evaluation with stress test and/or angiogram when 

stable.








Code(s): I50.9 - HEART FAILURE, UNSPECIFIED   


Qualifiers: 


   Congestive heart failure type: unspecified   Congestive heart failure 

chronicity: unspecified   Qualified Code(s): I50.9 - Heart failure, unspecified

   





(7) Cigarette nicotine dependence


Assessment/Plan: 


Pt has not smoked for a week; consider aid with patch or pill.


Code(s): F17.210 - NICOTINE DEPENDENCE, CIGARETTES, UNCOMPLICATED   





(8) Dixon cardiac risk >20% in next 10 years


Assessment/Plan: 


Coronary artery evaluation with stress MIBI and/or coronary angiogram when 

stable.


Aggressive lipid control; diet change to heart-healthy one, with weight loss; 

BP control; treatment for substance abuse (including alcohol).


Code(s): Z91.89 - OTH PERSONAL RISK FACTORS, NOT ELSEWHERE CLASSIFIED   





(9) Hyperglycemia


Assessment/Plan: 


f/u fasting glucose, HGBA1c.


Code(s): R73.9 - HYPERGLYCEMIA, UNSPECIFIED   





(10) Renal insufficiency


Assessment/Plan: 


on fluids (problematic with pulmonary status)


Improving BUN/Cr; f/u Is and Os..





Code(s): N28.9 - DISORDER OF KIDNEY AND URETER, UNSPECIFIED   





(11) Elevated LFTs


Code(s): R79.89 - OTHER SPECIFIED ABNORMAL FINDINGS OF BLOOD CHEMISTRY   





(12) Hypoalbuminemia


Code(s): E88.09 - OTH DISORDERS OF PLASMA-PROTEIN METABOLISM, NEC   





(13) Hypokalemia


Assessment/Plan: 


replete, and maintain 4-4.5.





Code(s): E87.6 - HYPOKALEMIA   





viral pna ARDS septic shock, s tachycardia


intubated overnight





cont abx respiratory support and pressors


prognosis is guarded 


cc time 37 min

## 2018-01-24 NOTE — PN
Teaching Attending Note


Name of Resident: Chapincito Carroll





ATTENDING PHYSICIAN STATEMENT





I saw and evaluated the patient.


I reviewed the resident's note and discussed the case with the resident.


I agree with the resident's findings and plan as documented.








SUBJECTIVE:





Pt seen and examined in the ICU. Overnight events noted, pt now intubated, 

sedated, paralyzed on vecuronium gtt. Vented on volume assist control with 100% 

FiO2 and PEEP 15.





OBJECTIVE:





 Last Vital Signs











Temp Pulse Resp BP Pulse Ox


 


 102.8 F H  136 H  20   101/79   98 


 


 01/24/18 10:00  01/24/18 10:00  01/24/18 11:26  01/24/18 11:47  01/24/18 08:40








 Intake & Output











 01/21/18 01/22/18 01/23/18 01/24/18





 23:59 23:59 23:59 23:59


 


Intake Total 1800 1170 1200 1043


 


Output Total 2400 3100 1800 200


 


Balance -600 -1930 -600 843


 


Weight 91.257 kg 91.308 kg 88.592 kg 88 kg








Gen: intubated, sedated, paralyzed


Heart: tachycardic, regular


Lung: scattered rhonchi


Abd: soft, nontender


Ext: trace edema





 CBC, BMP





 01/24/18 06:35 





 01/24/18 06:35 





Active Medications





Acetaminophen (Ofirmev Injection -)  1,000 mg IVPB Q6H PRN


   PRN Reason: FEVER


   Last Admin: 01/24/18 09:42 Dose:  1,000 mg


Chlorhexidine Gluconate (Hibiclens For Decolonization -)  1 applic TP HS CINTIA


   Last Admin: 01/23/18 21:34 Dose:  1 applic


Heparin Sodium (Porcine) (Heparin -)  5,000 unit SQ BID CINTIA


   Last Admin: 01/24/18 10:16 Dose:  5,000 unit


Levofloxacin (Levaquin 750 Mg Premixed Ivpb -)  750 mg in 150 mls @ 150 mls/hr 

IVPB Q24H CINTIA


   Last Admin: 01/23/18 17:36 Dose:  150 mls/hr


Propofol (Diprivan -)  1,000,000 mcg in 100 mls @ 7.973 mls/hr IVPB TITR CINTIA; 

15 MCG/KG/MIN


   PRN Reason: Protocol


   Last Admin: 01/24/18 11:47 Dose:  50 mcg/kg/min, 26.578 mls/hr


Phenylephrine HCl 20,000 mcg/ (Sodium Chloride)  250 mls @ 75 mls/hr IVPB ASDIR 

CINTIA; 100 MCG/MIN


   PRN Reason: Protocol


   Last Admin: 01/24/18 11:47 Dose:  60 mcg/min, 45 mls/hr


Fentanyl 500 mcg/ Dextrose  100 mls @ 40 mls/hr IVPB TITR CINTIA; 200 MCG/HR


   PRN Reason: Protocol


   Stop: 01/25/18 01:14


   Last Admin: 01/24/18 08:32 Dose:  30 mls/hr


Vecuronium Bromide 50 mg/ (Dextrose)  250 mls @ 53.15 mls/hr IVPB TITR CINTIA


   PRN Reason: 2 MCG/KG/MIN


   Last Admin: 01/24/18 12:23 Dose:  2 mcg/kg/min, 53.15 mls/hr


Mupirocin (Bactroban Ointment (For Decolonization) -)  1 applic NS BID [facility]


   Stop: 01/26/18 21:59


   Last Admin: 01/24/18 10:19 Dose:  1 applic


Oseltamivir Phosphate (Tamiflu -)  75 mg PO BID [facility]


   Stop: 01/26/18 21:59


   Last Admin: 01/24/18 10:19 Dose:  75 mg


Pantoprazole Sodium (Protonix Iv)  40 mg IVPUSH DAILY [facility]


   Last Admin: 01/24/18 10:16 Dose:  40 mg


Sodium Chloride (Ocean Spray Nasal Spray -)  2 spray NS TID PRN


   PRN Reason: NASAL CONGESTION


   Last Admin: 01/22/18 22:17 Dose:  2 sprays








ASSESSMENT AND PLAN:


Acute Hypoxic Respiratory Failure


r/o Influenza


Multilobar Pneumonia


Severe Sepsis


ARDS


Acute Kidney Injury


Lactic Acidosis


Elevated LFTs likely ischemic injury


+Troponins likely Demand Ischemia


HTN


Asthma


Alcohol Abuse





-  antibiotics, tamiflu per ID


-  f/u cultures


-  low tidal volume ventilation, keep Pplat <30


-  titrate FiO2, PEEP to keep SpO2 >88%


-  allow permissive hypercapnea


-  will place central line


-  monitor urine output, creatinine


-  trend LFTs, cardiac enzymes


-  sedate, paralyze for vent synchrony


-  not a candidate for weaning at this time


-  call out for transfer to tertiary care for nitric oxide/ECMO consideration








additional critical care time spent in reviewing chart, evaluating patient and 

formulating plan 65 min

## 2018-01-24 NOTE — PN
Physical Exam: 


SUBJECTIVE: Patient seen and examined in ICU.


Patient overnight was desaturating and uncomfortable. Patient was intubated/

sedated/paralyzed.


Vent Settings: Rate 30, , FIO2 100%, PEEP 15








OBJECTIVE:





 Vital Signs











 Period  Temp  Pulse  Resp  BP Sys/Manriquez  Pulse Ox


 


 Last 24 Hr  98.8 F-102.8 F    15-34  /  91-98














GENERAL: The patient is Intubated,sedated, paralyzed


NECK: Trachea midline, full range of motion, supple. 


LUNGS: Breath sounds equal, Tachypneic, Bibasilar rhonchi


HEART: tachycardic, S1, S2 without murmur, rub or gallop.


ABDOMEN: Soft, nontender, nondistended, normoactive bowel sounds


EXTREMITIES: 2+ pulses, warm, well-perfused, no edema. 


PSYCH: Normal mood, normal affect.


SKIN: Warm, dry, normal turgor, no rashes or lesions noted














 Laboratory Results - last 24 hr











  01/23/18 01/23/18 01/23/18





  05:05 15:20 15:20


 


WBC   


 


RBC   


 


Hgb   


 


Hct   


 


MCV   


 


MCH   


 


MCHC   


 


RDW   


 


Plt Count  207  


 


MPV   


 


Neutrophils %   


 


Lymphocytes %   


 


Puncture Site   


 


Patient Temperature   


 


ABG pH   


 


ABG pCO2 at Pt Temp   


 


ABG pO2 at Pt Temp   


 


ABG HCO3   


 


ABG O2 Sat (Measured)   


 


ABG O2 Content   


 


ABG Base Excess   


 


Long Test   


 


O2 Delivery Device   


 


Oxygen Flow Rate   


 


Vent Mode   


 


Vent Rate   


 


Mechanical Rate   


 


PEEP   


 


Pressure Support Vent   


 


Sodium   


 


Potassium   


 


Chloride   


 


Carbon Dioxide   


 


Anion Gap   


 


BUN   


 


Creatinine   


 


Creat Clearance w eGFR   


 


Random Glucose   


 


Calcium   


 


Phosphorus   


 


Magnesium   


 


Total Bilirubin   


 


AST   


 


ALT   


 


Alkaline Phosphatase   


 


Creatine Kinase    1251 H


 


Creatine Kinase Index    0.1


 


CK-MB (CK-2)    1.358


 


Troponin I    0.29 H D


 


C-Reactive Protein   


 


Total Protein   


 


Albumin   


 


Triglycerides   205 H 


 


Cholesterol   102 


 


Total LDL Cholesterol   69 


 


HDL Cholesterol   13 L 














  01/24/18 01/24/18 01/24/18





  01:05 06:00 06:15


 


WBC   


 


RBC   


 


Hgb   


 


Hct   


 


MCV   


 


MCH   


 


MCHC   


 


RDW   


 


Plt Count   


 


MPV   


 


Neutrophils %   


 


Lymphocytes %   


 


Puncture Site  Right radial   Left radial


 


Patient Temperature   


 


ABG pH  7.26 L D   7.24 L*


 


ABG pCO2 at Pt Temp  51.7 H D   44.5


 


ABG pO2 at Pt Temp  112.0 H D   89.0  D


 


ABG HCO3  22.3   18.5 L


 


ABG O2 Sat (Measured)  96.7   94.4


 


ABG O2 Content  17.0   16.3


 


ABG Base Excess  -4.6 L   -8.2 L


 


Long Test  Positive   Positive


 


O2 Delivery Device  Mech   Mech vent


 


Oxygen Flow Rate  100%   100%


 


Vent Mode  A/c   A/c


 


Vent Rate  22   30


 


Mechanical Rate   


 


PEEP  15.0   15.0


 


Pressure Support Vent  400   400


 


Sodium   


 


Potassium   


 


Chloride   


 


Carbon Dioxide   


 


Anion Gap   


 


BUN   


 


Creatinine   


 


Creat Clearance w eGFR   


 


Random Glucose   


 


Calcium   


 


Phosphorus   


 


Magnesium   


 


Total Bilirubin   


 


AST   


 


ALT   


 


Alkaline Phosphatase   


 


Creatine Kinase   Cancelled 


 


Creatine Kinase Index   


 


CK-MB (CK-2)   


 


Troponin I   Cancelled 


 


C-Reactive Protein   


 


Total Protein   


 


Albumin   


 


Triglycerides   


 


Cholesterol   


 


Total LDL Cholesterol   


 


HDL Cholesterol   














  01/24/18 01/24/18 01/24/18





  06:35 06:35 06:35


 


WBC  29.6 H D  


 


RBC  4.09  


 


Hgb  12.6  


 


Hct  39.1  


 


MCV  95.5  


 


MCH  30.9  


 


MCHC  32.3  


 


RDW  13.9  


 


Plt Count  241  


 


MPV  8.7  


 


Neutrophils %  No Result Required.  


 


Lymphocytes %  No Result Required.  


 


Puncture Site   


 


Patient Temperature   


 


ABG pH   


 


ABG pCO2 at Pt Temp   


 


ABG pO2 at Pt Temp   


 


ABG HCO3   


 


ABG O2 Sat (Measured)   


 


ABG O2 Content   


 


ABG Base Excess   


 


Long Test   


 


O2 Delivery Device   


 


Oxygen Flow Rate   


 


Vent Mode   


 


Vent Rate   


 


Mechanical Rate   


 


PEEP   


 


Pressure Support Vent   


 


Sodium   138 


 


Potassium   4.3  D 


 


Chloride   102 


 


Carbon Dioxide   21 


 


Anion Gap   15 


 


BUN   24 H D 


 


Creatinine   2.4 H D 


 


Creat Clearance w eGFR   29.16 


 


Random Glucose   136 H D 


 


Calcium   7.9 L 


 


Phosphorus   6.8 H D 


 


Magnesium   2.8 H D 


 


Total Bilirubin   1.2 H 


 


AST   163 H D 


 


ALT   139 H D 


 


Alkaline Phosphatase   193 H 


 


Creatine Kinase   884 H 


 


Creatine Kinase Index   0.3 


 


CK-MB (CK-2)   3.25 


 


Troponin I   1.79 H* D 


 


C-Reactive Protein   24.1 H  Cancelled


 


Total Protein   7.5 


 


Albumin   1.9 L 


 


Triglycerides   


 


Cholesterol   


 


Total LDL Cholesterol   


 


HDL Cholesterol   














  01/24/18





  11:55


 


WBC 


 


RBC 


 


Hgb 


 


Hct 


 


MCV 


 


MCH 


 


MCHC 


 


RDW 


 


Plt Count 


 


MPV 


 


Neutrophils % 


 


Lymphocytes % 


 


Puncture Site  Left radial


 


Patient Temperature  103.5


 


ABG pH  7.01 L* D


 


ABG pCO2 at Pt Temp  88.1 H* D


 


ABG pO2 at Pt Temp  99.5


 


ABG HCO3  21.2 L


 


ABG O2 Sat (Measured)  93.2


 


ABG O2 Content  17.2


 


ABG Base Excess  -12.3 L*


 


Long Test  Positive


 


O2 Delivery Device  Other


 


Oxygen Flow Rate  100%


 


Vent Mode  A/c


 


Vent Rate  20


 


Mechanical Rate  Espirit


 


PEEP  15.0


 


Pressure Support Vent  380


 


Sodium 


 


Potassium 


 


Chloride 


 


Carbon Dioxide 


 


Anion Gap 


 


BUN 


 


Creatinine 


 


Creat Clearance w eGFR 


 


Random Glucose 


 


Calcium 


 


Phosphorus 


 


Magnesium 


 


Total Bilirubin 


 


AST 


 


ALT 


 


Alkaline Phosphatase 


 


Creatine Kinase 


 


Creatine Kinase Index 


 


CK-MB (CK-2) 


 


Troponin I 


 


C-Reactive Protein 


 


Total Protein 


 


Albumin 


 


Triglycerides 


 


Cholesterol 


 


Total LDL Cholesterol 


 


HDL Cholesterol 








Active Medications











Generic Name Dose Route Start Last Admin





  Trade Name Freq  PRN Reason Stop Dose Admin


 


Acetaminophen  1,000 mg  01/23/18 15:30  01/24/18 09:42





  Ofirmev Injection -  IVPB   1,000 mg





  Q6H PRN   Administration





  FEVER   


 


Chlorhexidine Gluconate  1 applic  01/21/18 22:00  01/23/18 21:34





  Hibiclens For Decolonization -  TP   1 applic





  HS CINTIA   Administration


 


Heparin Sodium (Porcine)  5,000 unit  01/24/18 10:00  01/24/18 10:16





  Heparin -  SQ   5,000 unit





  BID CINTIA   Administration


 


Levofloxacin  750 mg in 150 mls @ 150 mls/hr  01/21/18 18:30  01/23/18 17:36





  Levaquin 750 Mg Premixed Ivpb -  IVPB   150 mls/hr





  Q24H CINTIA   Administration


 


Propofol  1,000,000 mcg in 100 mls @ 7.973 mls/hr  01/24/18 00:15  01/24/18 11:

47





  Diprivan -  IVPB   50 mcg/kg/min





  TITR CINTIA   26.578 mls/hr





  Protocol   Administration





  15 MCG/KG/MIN   


 


Phenylephrine HCl 20,000 mcg/  250 mls @ 75 mls/hr  01/24/18 01:00  01/24/18 11:

47





  Sodium Chloride  IVPB   60 mcg/min





  ASDIR CINTIA   45 mls/hr





  Protocol   Administration





  100 MCG/MIN   


 


Fentanyl 500 mcg/ Dextrose  100 mls @ 40 mls/hr  01/24/18 01:38  01/24/18 08:32





  IVPB  01/25/18 01:14  30 mls/hr





  TITR CINTIA   Administration





  Protocol   





  200 MCG/HR   


 


Vecuronium Bromide 50 mg/  250 mls @ 53.15 mls/hr  01/24/18 06:10  01/24/18 12:

23





  Dextrose  IVPB   2 mcg/kg/min





  TITR CINTIA   53.15 mls/hr





  2 MCG/KG/MIN   Administration


 


Mupirocin  1 applic  01/21/18 22:00  01/24/18 10:19





  Bactroban Ointment (For Decolonization) -  NS  01/26/18 21:59  1 applic





  BID CINTIA   Administration


 


Oseltamivir Phosphate  75 mg  01/21/18 22:00  01/24/18 10:19





  Tamiflu -  PO  01/26/18 21:59  75 mg





  BID CINTIA   Administration


 


Pantoprazole Sodium  40 mg  01/24/18 10:00  01/24/18 10:16





  Protonix Iv  IVPUSH   40 mg





  DAILY CINTIA   Administration


 


Sodium Chloride  2 spray  01/22/18 20:07  01/22/18 22:17





  Ocean Spray Nasal Spray -  NS   2 sprays





  TID PRN   Administration





  NASAL CONGESTION   











ASSESSMENT/PLAN:


47 year old M with pmh of HTN, asthma, and ETOH abuse presents with ARDS and 

acute hypoxic respiratory failure now intubated





#Resp


ARDS


Acute hypoxic respiratory failure


-Patient intubated


-Transfer to [place] initiated/accepted


-Keep plateau pressure <30


-Titrate FIO2 to maintain spo2 >88


-Will place central line today for cardiovascular support





#Renal


ALIDA, improved


-Monitor urine output, creatinine


-Avoid nephrotoxic medications





#ID


ARDS


Possible Influenza +/- lung consolidation


-Continue vancomycin, levaquin, and tamiflu for influenza and lung 

consolidations





#FEN/GI


-No IVF


-Replete K+, monitor BMP


-NPO





#PPx


-SCDs for DVT ppx and heparin 5000 u sq bid


-No GI ppx





#Dispo


Continue ICU level of care








Visit type





- Emergency Visit


Emergency Visit: Yes


ED Registration Date: 01/21/18


Care time: The patient presented to the Emergency Department on the above date 

and was hospitalized for further evaluation of their emergent condition.





- New Patient


This patient is new to me today: No





- Critical Care


Critical Care patient: Yes


Total Critical Care Time (in minutes): 45


Critical Care Statement: The care of this patient involved high complexity 

decision making to prevent further life threatening deterioration of the patient

's condition and/or to evaluate & treat vital organ system(s) failure or risk 

of failure.

## 2018-01-24 NOTE — PROC
<Kassandra Jones - Last Filed: 01/24/18 14:19>





Central Line Insertion


Indication: CVP Monitoring, Sepsis, Vasopressor


Risks and Benefits Explained: Yes


Consent on Chart: Yes (Family signed)


Central Line: Triple Lumen Catheter


Anesthesia: 1% Lidocaine


Sterile Technique: Yes


Ultrasound Guided Assistance: Yes


Position: Right Internal Jugular


Post Insertion: Yes: Chest X-Ray Ordered


Sterile Dressing Applied: Yes





<Kennedy Mallory MD - Last Filed: 01/24/18 15:10>





Procedure Note


Procedure: 





I supervised and was present during the entire procedure.





Kennedy Mallory MD

## 2018-01-24 NOTE — PN
Progress Note (short form)





- Note


Progress Note: 





patient seen and examined in ICU 


Intubated /sedated / 


GF and mother at bedside 


extensive discussion with family regarding current condition / plans for 

transfer 


to [place] 


have discussed management / critical condition of patient 


all questions answered / all concerns addressed





 Vital Signs


 Intake & Output











 01/21/18 01/22/18 01/23/18 01/24/18





 23:59 23:59 23:59 23:59


 


Intake Total 1800 1170 1200 1043


 


Output Total 2400 3100 1800 200


 


Balance -600 -1930 -600 843


 


Weight 201 lb 3 oz 201 lb 4.8 oz 195 lb 5 oz 194 lb 0.108 oz








 ABG Results











ABG pH  7.01  (7.35-7.45)  L* D 01/24/18  11:55    


 


ABG pCO2 at Pt Temp  88.1 mmHg (35-45)  H* D 01/24/18  11:55    


 


ABG pO2 at Pt Temp  99.5 mmHg ()   01/24/18  11:55    


 


ABG HCO3  21.2 meq/L (22-26)  L  01/24/18  11:55    


 


ABG O2 Sat (Measured)  93.2 % (90-98.9)   01/24/18  11:55    


 


ABG O2 Content  17.2 % vol (15-22)   01/24/18  11:55    


 


ABG Base Excess  -12.3 meq/l (-2-2)  L*  01/24/18  11:55    





                                                         intubated  A/C FiO2 100

% PEEP8 resp 20            











 Period  Temp  Pulse  Resp  BP Sys/Manriquez  Pulse Ox


 


 Last 24 Hr  98.8 F-102.8 F    15-34  /  91-98





                                                           T Max 103.5


sedated /paralyzed / intubated 


no pressors 


neck -jvd


heart S1/S2


Lungs  grossly clear some scattered rhonchi


abd  soft non tender  / acevedo in ploace 


ext  trace edema 





 CBC, BMP





 01/24/18 06:35 





 01/24/18 06:35 





 CMP











Sodium  138 mmol/L (136-145)   01/24/18  06:35    


 


Potassium  4.3 mmol/L (3.5-5.1)  D 01/24/18  06:35    


 


Chloride  102 mmol/L ()   01/24/18  06:35    


 


Carbon Dioxide  21 mmol/L (21-32)   01/24/18  06:35    


 


Anion Gap  15  (8-16)   01/24/18  06:35    


 


BUN  24 mg/dL (7-18)  H D 01/24/18  06:35    


 


Creatinine  2.4 mg/dL (0.7-1.3)  H D 01/24/18  06:35    


 


Creat Clearance w eGFR  29.16  (>60)   01/24/18  06:35    


 


Random Glucose  136 mg/dL ()  H D 01/24/18  06:35    


 


Lactic Acid  1.7 mmol/L (0.0-2.0)   01/21/18  13:15    


 


Calcium  7.9 mg/dL (8.5-10.1)  L  01/24/18  06:35    


 


Phosphorus  6.8 mg/dL (2.5-4.9)  H D 01/24/18  06:35    


 


Magnesium  2.8 mg/dL (1.8-2.4)  H D 01/24/18  06:35    


 


Total Bilirubin  1.2 mg/dL (0.2-1.0)  H  01/24/18  06:35    


 


AST  163 U/L (15-37)  H D 01/24/18  06:35    


 


ALT  139 U/L (12-78)  H D 01/24/18  06:35    


 


Alkaline Phosphatase  193 U/L ()  H  01/24/18  06:35    


 


Ammonia  82.71 umol/L (11-32)  H  01/23/18  05:05    


 


Creatine Kinase  884 IU/L ()  H  01/24/18  06:35    


 


Creatine Kinase Index  0.3 % (0.0-5.0)   01/24/18  06:35    


 


CK-MB (CK-2)  3.25 ng/mL (0.5-3.6)   01/24/18  06:35    


 


Troponin I  1.79 ng/ml (0.00-0.05)  H* D 01/24/18  06:35    


 


C-Reactive Protein  24.1 MG/DL (0.00-0.3)  H  01/24/18  06:35    


 


B-Natriuretic Peptide  107.08 pg/ml (5-125)   01/21/18  09:30    


 


Total Protein  7.5 g/dl (6.4-8.2)   01/24/18  06:35    


 


Albumin  1.9 g/dl (3.4-5.0)  L  01/24/18  06:35    


 


Triglycerides  205 mg/dL ()  H  01/23/18  15:20    


 


Cholesterol  102 mg/dL ()   01/23/18  15:20    


 


Total LDL Cholesterol  69 mg/dL (5-100)   01/23/18  15:20    


 


HDL Cholesterol  13 mg/dL (40-60)  L  01/23/18  15:20    


 


TSH  0.36 uIU/ml (0.358-3.74)   01/22/18  05:22    








 Microbiology





01/22/18 18:00   Sputum - Expectorated   Gram Stain - Final


01/22/18 18:00   Sputum - Expectorated   Sputum Culture - Preliminary


                            Non Lactose Fermenting Gnb


                            Pending Organism


01/21/18 08:10   Blood - Peripheral Venous   Blood Culture - Preliminary


                            NO GROWTH OBTAINED AFTER 72 HOURS, INCUBATION TO 

CONTINUE


                            FOR 2 DAYS.


01/21/18 08:00   Blood - Peripheral Venous   Blood Culture - Preliminary


                            NO GROWTH OBTAINED AFTER 72 HOURS, INCUBATION TO 

CONTINUE


                            FOR 2 DAYS.


01/21/18 13:20   Urine - Urine Clean Catch   Urine Culture - Final


                            NO GROWTH OBTAINED


01/22/18 12:00   Nasopharyngeal Swab   Respiratory Syncytial Virus Ag - Final


01/21/18 13:10   Serum   Legionella Serology - Preliminary


01/21/18 11:35   Urine For Antigen Detection   Legionella Antigen - Final


01/21/18 11:35   Urine For Antigen Detection   Streptococcus pneumoniae Antigen 

(M - Final


01/21/18 13:20   Nasopharyngeal Swab   Influenza Types A,B Antigen (NICANOR) - Final


01/21/18 13:20   Nasopharyngeal Swab    - Final

















Active Medications





Acetaminophen (Ofirmev Injection -)  1,000 mg IVPB Q6H PRN


   PRN Reason: FEVER


   Last Admin: 01/24/18 09:42 Dose:  1,000 mg


Chlorhexidine Gluconate (Hibiclens For Decolonization -)  1 applic TP HS [facility]


   Last Admin: 01/23/18 21:34 Dose:  1 applic


Heparin Sodium (Porcine) (Heparin -)  5,000 unit SQ BID [facility]


   Last Admin: 01/24/18 10:16 Dose:  5,000 unit


Levofloxacin (Levaquin 750 Mg Premixed Ivpb -)  750 mg in 150 mls @ 150 mls/hr 

IVPB Q24H [facility]


   Last Admin: 01/23/18 17:36 Dose:  150 mls/hr


Propofol (Diprivan -)  1,000,000 mcg in 100 mls @ 7.973 mls/hr IVPB TITR [facility]; 

15 MCG/KG/MIN


   PRN Reason: Protocol


   Last Admin: 01/24/18 11:47 Dose:  50 mcg/kg/min, 26.578 mls/hr


Phenylephrine HCl 20,000 mcg/ (Sodium Chloride)  250 mls @ 75 mls/hr IVPB ASDIR 

[facility]; 100 MCG/MIN


   PRN Reason: Protocol


   Last Admin: 01/24/18 11:47 Dose:  60 mcg/min, 45 mls/hr


Fentanyl 500 mcg/ Dextrose  100 mls @ 40 mls/hr IVPB TITR [facility]; 200 MCG/HR


   PRN Reason: Protocol


   Stop: 01/25/18 01:14


   Last Admin: 01/24/18 08:32 Dose:  30 mls/hr


Vecuronium Bromide 50 mg/ (Dextrose)  250 mls @ 53.15 mls/hr IVPB TITR [facility]


   PRN Reason: 2 MCG/KG/MIN


   Last Admin: 01/24/18 12:23 Dose:  2 mcg/kg/min, 53.15 mls/hr


Mupirocin (Bactroban Ointment (For Decolonization) -)  1 applic NS BID [facility]


   Stop: 01/26/18 21:59


   Last Admin: 01/24/18 10:19 Dose:  1 applic


Oseltamivir Phosphate (Tamiflu -)  75 mg PO BID [facility]


   Stop: 01/26/18 21:59


   Last Admin: 01/24/18 10:19 Dose:  75 mg


Pantoprazole Sodium (Protonix Iv)  40 mg IVPUSH DAILY [facility]


   Last Admin: 01/24/18 10:16 Dose:  40 mg


Sodium Chloride (Ocean Spray Nasal Spray -)  2 spray NS TID PRN


   PRN Reason: NASAL CONGESTION


   Last Admin: 01/22/18 22:17 Dose:  2 sprays











Assment  





   # ARDS 


        acute  hypoxic respiratory failure 


        intubated / sedated / paralyzed 


        requiring 100% FiO2/ PEEP 15/ aim sats >88% 


        management per pulmonary 


        Abx / flu coverage  per ID 


        Plans for transfer  - ECMO consideration 


                               [facility]





   # ALIDA


   #  rhabdo  


   # elevated LFT  - ischemic injury 


   # elevated TNI  -- demand ischemia 


   # fever  103.5  ? central  / follow up c/s 


                       contniue abx per ID 


   # lactic acidosis 





    Management per CC MD  / case discussed with Dr Mallory 








hx of : 


   HTN


   Asthma 


   Smoker 


   ETOH abuse 


   anabolic steroid use ( distant ) 








Plans 


arrangement in process for transfer to [facility] institution 


Mother and GF aware 


time spent with them > 30 min 


allowed to ask all questions and concerns 











Problem List





- Problems


(1) ARDS (adult respiratory distress syndrome)


Code(s): J80 - ACUTE RESPIRATORY DISTRESS SYNDROME   





(2) Rhabdomyolysis


Code(s): M62.82 - RHABDOMYOLYSIS   





(3) Abnormal LFTs


Code(s): R94.5 - ABNORMAL RESULTS OF LIVER FUNCTION STUDIES   





(4) Acute respiratory failure


Code(s): J96.00 - ACUTE RESPIRATORY FAILURE, UNSP W HYPOXIA OR HYPERCAPNIA   





(5) Hypoxia


Code(s): R09.02 - HYPOXEMIA   





(6) Obesity


Code(s): E66.9 - OBESITY, UNSPECIFIED   





(7) Pneumonia


Code(s): J18.9 - PNEUMONIA, UNSPECIFIED ORGANISM   





(8) Substance abuse


Code(s): F19.10 - OTHER PSYCHOACTIVE SUBSTANCE ABUSE, UNCOMPLICATED

## 2018-01-24 NOTE — PN
Progress Note (short form)





- Note


Progress Note: 





Called to intubate this 48 y/o male with ARDS/acute respiratory failure. Pt. 

worsening with NIPPV so intubation required. 


After giving 200mg of propofol and 100mg of succinylcholine, pt. was intubated 

under direct laryngoscopy with a #8.0 


endotracheal tube. +etCO2 and bilateral breath sounds were noted. ETT was 

secured and pt. was placed on the ventilator.


Further management as per ICU team.

## 2018-01-24 NOTE — PN
Progress Note (short form)





- Note


Progress Note: 





47yom with Hx smoking and ETOH abuse admitted for hypoxic respiratory failure 

with ARDS pattern on 1/21. He was maintained on BiPAP O2 support and  being 

treated with Tamiflu and antibiotics for empiric CAP coverage. O/n became more 

restless and anxious and in distress with desaturation to 50's with removal of 

BiPAP mask. Anesthesia was called and he was intubated.  Placed on vent 

settings AC/%, TV 400cc (~6cc/kg)(VUE33bp), RR 28, PEEP 15. Initial plat 

pressure 27.7. Sedated with Propofol and Fent drips.  O2 sat improved from 85% 

to 92% with rocuronium push. Post intubation hypotension  to 90's-100's/ 60's. 

ETT and NGT confirmed with CXR. Sputum culture ordered. Antibiotics continued. 

Attempted to update family. No response to phone call. 





 Vital Signs











 Period  Temp  Pulse  Resp  BP Sys/Manriquez  Pulse Ox


 


 Last 24 Hr  98.8 F-101.2 F    15-34  /  91-95








 ABG Results











ABG pH  7.26  (7.35-7.45)  L D 01/24/18  01:05    


 


ABG pCO2 at Pt Temp  51.7 mmHg (35-45)  H D 01/24/18  01:05    


 


ABG pO2 at Pt Temp  112.0 mmHg ()  H D 01/24/18  01:05    


 


ABG HCO3  22.3 meq/L (22-26)   01/24/18  01:05    


 


ABG O2 Sat (Measured)  96.7 % (90-98.9)   01/24/18  01:05    


 


ABG O2 Content  17.0 % vol (15-22)   01/24/18  01:05    


 


ABG Base Excess  -4.6 meq/l (-2-2)  L  01/24/18  01:05    








 Current Medications





Acetaminophen (Ofirmev Injection -)  1,000 mg IVPB Q6H PRN


   PRN Reason: FEVER


Chlorhexidine Gluconate (Hibiclens For Decolonization -)  1 applic TP HS [facility]


   Last Admin: 01/23/18 21:34 Dose:  1 applic


Heparin Sodium (Porcine) (Heparin -)  5,000 unit SQ BID [facility]


Vancomycin HCl 1,250 mg/ (Dextrose)  250 mls @ 166.667 mls/hr IVPB Q12H [facility]


   PRN Reason: Protocol


   Last Admin: 01/23/18 21:33 Dose:  166.667 mls/hr


Levofloxacin (Levaquin 750 Mg Premixed Ivpb -)  750 mg in 150 mls @ 150 mls/hr 

IVPB Q24H [facility]


   Last Admin: 01/23/18 17:36 Dose:  150 mls/hr


Propofol (Diprivan -)  1,000,000 mcg in 100 mls @ 7.973 mls/hr IVPB TITR [facility]; 

15 MCG/KG/MIN


   PRN Reason: Protocol


   Last Admin: 01/24/18 01:06 Dose:  47.03 mcg/kg/min, 25 mls/hr


Phenylephrine HCl 20,000 mcg/ (Sodium Chloride)  250 mls @ 75 mls/hr IVPB ASDIR 

[facility]; 100 MCG/MIN


   PRN Reason: Protocol


Vecuronium Bromide 50 mg/ (Dextrose)  250 mls @ 26.57 mls/hr IVPB TITR [facility]


   PRN Reason: 1 MCG/KG/MIN


Fentanyl 500 mcg/ Dextrose  100 mls @ 40 mls/hr IVPB TITR [facility]; 200 MCG/HR


   PRN Reason: Protocol


   Stop: 01/25/18 01:14


   Last Admin: 01/24/18 02:14 Dose:  40 mls/hr


Mupirocin (Bactroban Ointment (For Decolonization) -)  1 applic NS BID [facility]


   Stop: 01/26/18 21:59


   Last Admin: 01/23/18 21:34 Dose:  1 applic


Oseltamivir Phosphate (Tamiflu -)  75 mg PO BID [facility]


   Stop: 01/26/18 21:59


   Last Admin: 01/23/18 22:05 Dose:  75 mg


Pantoprazole Sodium (Protonix Iv)  40 mg IVPUSH DAILY [facility]


Sodium Chloride (Ocean Spray Nasal Spray -)  2 spray NS TID PRN


   PRN Reason: NASAL CONGESTION


   Last Admin: 01/22/18 22:17 Dose:  2 sprays





Hypoxemic/hypercarbic respiratory failure/ARDS now intubated, sedated





Plan:


-LTVV for plat goal<30; ph 7.3 to 7.4


-ABG


-Sedation for RASS-4 to -5


-Paralysis as needed for persistent vent synchrony and hypoxia


-Diuresis as able


-Blaise drip for hypotension m/l 2/2 sedation +/- sepsis


-Send cultures


-Continue antibiotics


-Consider [place], proning if refractory to vent 


-Consider ECMO consult





KYRA Castro


CC time 35mins 





Problem List





- Problems


(1) ARDS (adult respiratory distress syndrome)


Code(s): J80 - ACUTE RESPIRATORY DISTRESS SYNDROME   





(2) Acute respiratory failure


Code(s): J96.00 - ACUTE RESPIRATORY FAILURE, UNSP W HYPOXIA OR HYPERCAPNIA   





(3) Cigarette nicotine dependence


Code(s): F17.210 - NICOTINE DEPENDENCE, CIGARETTES, UNCOMPLICATED

## 2018-01-25 VITALS — SYSTOLIC BLOOD PRESSURE: 108 MMHG | HEART RATE: 110 BPM | TEMPERATURE: 99 F | DIASTOLIC BLOOD PRESSURE: 84 MMHG
